# Patient Record
Sex: FEMALE | Race: WHITE | Employment: FULL TIME | URBAN - METROPOLITAN AREA
[De-identification: names, ages, dates, MRNs, and addresses within clinical notes are randomized per-mention and may not be internally consistent; named-entity substitution may affect disease eponyms.]

---

## 2017-04-05 PROBLEM — O36.5990 SMALL FOR DATES AFFECTING MANAGEMENT OF MOTHER: Status: ACTIVE | Noted: 2017-04-05

## 2017-04-05 PROBLEM — O09.299 H/O POSTPARTUM HEMORRHAGE, CURRENTLY PREGNANT: Status: ACTIVE | Noted: 2017-04-05

## 2017-04-05 PROBLEM — O09.03 ENCOUNTER FOR SUPERVISION OF HIGH-RISK PREGNANCY WITH HISTORY OF INFERTILITY IN THIRD TRIMESTER: Status: ACTIVE | Noted: 2017-04-05

## 2017-04-05 PROBLEM — O36.5990 SMALL FOR DATES AFFECTING MANAGEMENT OF MOTHER: Status: RESOLVED | Noted: 2017-04-05 | Resolved: 2017-04-05

## 2017-04-05 PROBLEM — R82.71 GBS BACTERIURIA: Status: ACTIVE | Noted: 2017-04-05

## 2017-04-05 PROBLEM — O36.8390 VARIABLE FETAL HEART RATE DECELERATIONS, ANTEPARTUM: Status: ACTIVE | Noted: 2017-04-05

## 2017-04-05 PROBLEM — O09.03 ENCOUNTER FOR SUPERVISION OF HIGH-RISK PREGNANCY WITH HISTORY OF INFERTILITY IN THIRD TRIMESTER: Status: RESOLVED | Noted: 2017-04-05 | Resolved: 2017-04-05

## 2017-04-05 PROBLEM — E28.2 PCOS (POLYCYSTIC OVARIAN SYNDROME): Status: ACTIVE | Noted: 2017-04-05

## 2017-04-05 PROBLEM — O28.3 ABNORMAL FETAL ULTRASOUND: Status: ACTIVE | Noted: 2017-04-05

## 2017-04-05 PROBLEM — O26.899 RH NEGATIVE STATUS DURING PREGNANCY: Status: ACTIVE | Noted: 2017-04-05

## 2017-04-05 PROBLEM — Z67.91 RH NEGATIVE STATUS DURING PREGNANCY: Status: ACTIVE | Noted: 2017-04-05

## 2018-05-11 ENCOUNTER — HOSPITAL ENCOUNTER (OUTPATIENT)
Age: 28
Discharge: HOME | End: 2018-05-13
Payer: COMMERCIAL

## 2018-05-11 DIAGNOSIS — E28.2 PCOS (POLYCYSTIC OVARIAN SYNDROME): ICD-10-CM

## 2018-05-11 LAB
FOLLICLE STIMULATING HORMONE: 7.3 MIU/ML
GLUCOSE FASTING: 75 MG/DL (ref 74–109)
GONADOTROPIN, CHORIONIC (HCG) QUANT: <0.1 MIU/ML
LUTEINIZING HORMONE: 50.7 MIU/ML
PROLACTIN: 6.37 NG/ML
TSH SERPL DL<=0.05 MIU/L-ACNC: 1.67 UIU/ML (ref 0.27–4.2)

## 2018-05-11 PROCEDURE — 84702 CHORIONIC GONADOTROPIN TEST: CPT

## 2018-05-11 PROCEDURE — 84146 ASSAY OF PROLACTIN: CPT

## 2018-05-11 PROCEDURE — 84403 ASSAY OF TOTAL TESTOSTERONE: CPT

## 2018-05-11 PROCEDURE — 82627 DEHYDROEPIANDROSTERONE: CPT

## 2018-05-11 PROCEDURE — 84270 ASSAY OF SEX HORMONE GLOBUL: CPT

## 2018-05-11 PROCEDURE — 83001 ASSAY OF GONADOTROPIN (FSH): CPT

## 2018-05-11 PROCEDURE — 83002 ASSAY OF GONADOTROPIN (LH): CPT

## 2018-05-11 PROCEDURE — 84443 ASSAY THYROID STIM HORMONE: CPT

## 2018-05-11 PROCEDURE — 82947 ASSAY GLUCOSE BLOOD QUANT: CPT

## 2018-05-11 PROCEDURE — 83498 ASY HYDROXYPROGESTERONE 17-D: CPT

## 2018-05-11 PROCEDURE — 83525 ASSAY OF INSULIN: CPT

## 2018-05-13 LAB — DHEAS (DHEA SULFATE): 126 UG/DL (ref 65–380)

## 2018-05-14 LAB — INSULIN: 5 UIU/ML (ref 3–19)

## 2018-05-15 LAB
SEX HORMONE BINDING GLOBULIN: 68 NMOL/L (ref 30–135)
TESTOSTERONE FREE-NONMALE: 4.4 PG/ML (ref 0.8–7.4)
TESTOSTERONE TOTAL: 40 NG/DL (ref 20–70)

## 2018-05-16 LAB — 17-OH PROGESTERONE LCMS: 136 NG/DL

## 2018-07-11 ENCOUNTER — HOSPITAL ENCOUNTER (OUTPATIENT)
Age: 28
Discharge: HOME | End: 2018-07-13

## 2018-07-11 PROCEDURE — 88305 TISSUE EXAM BY PATHOLOGIST: CPT

## 2018-09-17 ENCOUNTER — HOSPITAL ENCOUNTER (OUTPATIENT)
Age: 28
Discharge: HOME | End: 2018-09-19
Payer: COMMERCIAL

## 2018-09-17 DIAGNOSIS — N91.5 OLIGOMENORRHEA, UNSPECIFIED TYPE: ICD-10-CM

## 2018-09-17 PROBLEM — O36.8390 VARIABLE FETAL HEART RATE DECELERATIONS, ANTEPARTUM: Status: RESOLVED | Noted: 2017-04-05 | Resolved: 2018-09-17

## 2018-09-17 LAB
BILIRUBIN URINE: NEGATIVE
BLOOD, URINE: NEGATIVE
CLARITY: CLEAR
COLOR: YELLOW
GLUCOSE URINE: NEGATIVE MG/DL
KETONES, URINE: NEGATIVE MG/DL
LEUKOCYTE ESTERASE, URINE: NEGATIVE
NITRITE, URINE: NEGATIVE
PH UA: 6 (ref 5–9)
PROTEIN UA: NEGATIVE MG/DL
SPECIFIC GRAVITY UA: >=1.03 (ref 1–1.03)
UROBILINOGEN, URINE: 0.2 E.U./DL

## 2018-09-17 PROCEDURE — 87088 URINE BACTERIA CULTURE: CPT

## 2018-09-17 PROCEDURE — 81003 URINALYSIS AUTO W/O SCOPE: CPT

## 2018-09-19 LAB — URINE CULTURE, ROUTINE: NORMAL

## 2018-10-29 ENCOUNTER — HOSPITAL ENCOUNTER (OUTPATIENT)
Age: 28
Discharge: HOME | End: 2018-10-31
Payer: COMMERCIAL

## 2018-10-29 PROCEDURE — 87591 N.GONORRHOEAE DNA AMP PROB: CPT

## 2018-10-29 PROCEDURE — 87491 CHLMYD TRACH DNA AMP PROBE: CPT

## 2018-10-29 PROCEDURE — 88175 CYTOPATH C/V AUTO FLUID REDO: CPT

## 2018-11-01 LAB
CHLAMYDIA TRACHOMATIS AMPLIFIED DET: NORMAL
N GONORRHOEAE AMPLIFIED DET: NORMAL

## 2018-11-26 ENCOUNTER — HOSPITAL ENCOUNTER (OUTPATIENT)
Age: 28
Discharge: HOME | End: 2018-11-28
Payer: COMMERCIAL

## 2018-11-26 DIAGNOSIS — O09.92 HIGH-RISK PREGNANCY IN SECOND TRIMESTER: ICD-10-CM

## 2018-11-26 LAB
HBA1C MFR BLD: 5.1 % (ref 4–5.6)
HCT VFR BLD CALC: 41.3 % (ref 34–48)
HEMOGLOBIN: 13.3 G/DL (ref 11.5–15.5)
MCH RBC QN AUTO: 30.1 PG (ref 26–35)
MCHC RBC AUTO-ENTMCNC: 32.2 % (ref 32–34.5)
MCV RBC AUTO: 93.4 FL (ref 80–99.9)
PDW BLD-RTO: 12.6 FL (ref 11.5–15)
PLATELET # BLD: 214 E9/L (ref 130–450)
PMV BLD AUTO: 10.6 FL (ref 7–12)
RBC # BLD: 4.42 E12/L (ref 3.5–5.5)
TSH SERPL DL<=0.05 MIU/L-ACNC: 1.25 UIU/ML (ref 0.27–4.2)
WBC # BLD: 9.1 E9/L (ref 4.5–11.5)

## 2018-11-26 PROCEDURE — 86787 VARICELLA-ZOSTER ANTIBODY: CPT

## 2018-11-26 PROCEDURE — 86762 RUBELLA ANTIBODY: CPT

## 2018-11-26 PROCEDURE — 85027 COMPLETE CBC AUTOMATED: CPT

## 2018-11-26 PROCEDURE — 86900 BLOOD TYPING SEROLOGIC ABO: CPT

## 2018-11-26 PROCEDURE — 86901 BLOOD TYPING SEROLOGIC RH(D): CPT

## 2018-11-26 PROCEDURE — 86850 RBC ANTIBODY SCREEN: CPT

## 2018-11-26 PROCEDURE — 84443 ASSAY THYROID STIM HORMONE: CPT

## 2018-11-26 PROCEDURE — 87340 HEPATITIS B SURFACE AG IA: CPT

## 2018-11-26 PROCEDURE — 86803 HEPATITIS C AB TEST: CPT

## 2018-11-26 PROCEDURE — 86592 SYPHILIS TEST NON-TREP QUAL: CPT

## 2018-11-26 PROCEDURE — 86703 HIV-1/HIV-2 1 RESULT ANTBDY: CPT

## 2018-11-26 PROCEDURE — 83036 HEMOGLOBIN GLYCOSYLATED A1C: CPT

## 2018-11-27 LAB
ABO/RH: NORMAL
ANTIBODY SCREEN: NORMAL
HEPATITIS B SURFACE ANTIGEN INTERPRETATION: NORMAL
HEPATITIS C ANTIBODY INTERPRETATION: NORMAL
HIV-1 AND HIV-2 ANTIBODIES: NORMAL
RPR: NORMAL
RUBELLA ANTIBODY IGG: NORMAL
VARICELLA-ZOSTER VIRUS AB, IGG: NORMAL

## 2019-01-01 ENCOUNTER — HOSPITAL ENCOUNTER (EMERGENCY)
Age: 29
Discharge: HOME | End: 2019-01-01
Payer: COMMERCIAL

## 2019-01-01 ENCOUNTER — APPOINTMENT (OUTPATIENT)
Dept: GENERAL RADIOLOGY | Age: 29
End: 2019-01-01
Payer: COMMERCIAL

## 2019-01-01 VITALS
SYSTOLIC BLOOD PRESSURE: 112 MMHG | HEART RATE: 86 BPM | WEIGHT: 151 LBS | HEIGHT: 63 IN | DIASTOLIC BLOOD PRESSURE: 73 MMHG | BODY MASS INDEX: 26.75 KG/M2 | TEMPERATURE: 98 F | RESPIRATION RATE: 12 BRPM | OXYGEN SATURATION: 100 %

## 2019-01-01 DIAGNOSIS — S92.424A CLOSED NONDISPLACED FRACTURE OF DISTAL PHALANX OF RIGHT GREAT TOE, INITIAL ENCOUNTER: Primary | ICD-10-CM

## 2019-01-01 PROCEDURE — 6370000000 HC RX 637 (ALT 250 FOR IP): Performed by: PHYSICIAN ASSISTANT

## 2019-01-01 PROCEDURE — 73660 X-RAY EXAM OF TOE(S): CPT

## 2019-01-01 PROCEDURE — 99283 EMERGENCY DEPT VISIT LOW MDM: CPT

## 2019-01-01 RX ORDER — ACETAMINOPHEN 500 MG
1000 TABLET ORAL ONCE
Status: COMPLETED | OUTPATIENT
Start: 2019-01-01 | End: 2019-01-01

## 2019-01-01 RX ADMIN — ACETAMINOPHEN 1000 MG: 500 TABLET ORAL at 18:26

## 2019-01-01 ASSESSMENT — PAIN SCALES - GENERAL
PAINLEVEL_OUTOF10: 4
PAINLEVEL_OUTOF10: 5

## 2019-01-01 ASSESSMENT — PAIN DESCRIPTION - FREQUENCY: FREQUENCY: CONTINUOUS

## 2019-01-01 ASSESSMENT — PAIN DESCRIPTION - PAIN TYPE: TYPE: ACUTE PAIN

## 2019-01-01 ASSESSMENT — PAIN DESCRIPTION - PROGRESSION: CLINICAL_PROGRESSION: GRADUALLY WORSENING

## 2019-01-01 ASSESSMENT — PAIN DESCRIPTION - ORIENTATION: ORIENTATION: RIGHT

## 2019-01-01 ASSESSMENT — PAIN DESCRIPTION - LOCATION: LOCATION: TOE (COMMENT WHICH ONE)

## 2019-01-01 ASSESSMENT — PAIN DESCRIPTION - ONSET: ONSET: SUDDEN

## 2019-01-01 ASSESSMENT — PAIN DESCRIPTION - DESCRIPTORS: DESCRIPTORS: THROBBING

## 2019-02-15 ENCOUNTER — HOSPITAL ENCOUNTER (OUTPATIENT)
Age: 29
Discharge: HOME | End: 2019-02-17
Payer: COMMERCIAL

## 2019-02-15 DIAGNOSIS — Z34.93 PRENATAL CARE IN THIRD TRIMESTER: ICD-10-CM

## 2019-02-15 LAB
GLUCOSE TOLERANCE TEST 1 HOUR: 135 MG/DL
GLUCOSE TOLERANCE TEST 2 HOUR: 74 MG/DL
GLUCOSE TOLERANCE TEST FASTING: 73 MG/DL
HCT VFR BLD CALC: 37 % (ref 34–48)
HEMOGLOBIN: 11.7 G/DL (ref 11.5–15.5)
MCH RBC QN AUTO: 31.2 PG (ref 26–35)
MCHC RBC AUTO-ENTMCNC: 31.6 % (ref 32–34.5)
MCV RBC AUTO: 98.7 FL (ref 80–99.9)
PDW BLD-RTO: 13.2 FL (ref 11.5–15)
PLATELET # BLD: 177 E9/L (ref 130–450)
PMV BLD AUTO: 9.7 FL (ref 7–12)
RBC # BLD: 3.75 E12/L (ref 3.5–5.5)
WBC # BLD: 9.8 E9/L (ref 4.5–11.5)

## 2019-02-15 PROCEDURE — 86900 BLOOD TYPING SEROLOGIC ABO: CPT

## 2019-02-15 PROCEDURE — 86850 RBC ANTIBODY SCREEN: CPT

## 2019-02-15 PROCEDURE — 85027 COMPLETE CBC AUTOMATED: CPT

## 2019-02-15 PROCEDURE — 86901 BLOOD TYPING SEROLOGIC RH(D): CPT

## 2019-02-15 PROCEDURE — 82951 GLUCOSE TOLERANCE TEST (GTT): CPT

## 2019-02-16 LAB
ABO/RH: NORMAL
ANTIBODY SCREEN: NORMAL

## 2019-02-19 ENCOUNTER — HOSPITAL ENCOUNTER (OUTPATIENT)
Age: 29
Discharge: HOME | End: 2019-02-19
Attending: OBSTETRICS & GYNECOLOGY | Admitting: OBSTETRICS & GYNECOLOGY
Payer: COMMERCIAL

## 2019-02-19 PROBLEM — Z29.13 NEED FOR RHOGAM DUE TO RH NEGATIVE MOTHER: Status: ACTIVE | Noted: 2019-02-19

## 2019-02-19 LAB — RHIG LOT NUMBER: NORMAL

## 2019-02-19 PROCEDURE — 96372 THER/PROPH/DIAG INJ SC/IM: CPT

## 2019-02-19 PROCEDURE — 6360000002 HC RX W HCPCS: Performed by: OBSTETRICS & GYNECOLOGY

## 2019-02-19 RX ADMIN — HUMAN RHO(D) IMMUNE GLOBULIN 300 MCG: 300 INJECTION, SOLUTION INTRAMUSCULAR at 17:01

## 2019-03-13 ENCOUNTER — HOSPITAL ENCOUNTER (OUTPATIENT)
Age: 29
Discharge: HOME | End: 2019-03-13
Attending: EMERGENCY MEDICINE | Admitting: OBSTETRICS & GYNECOLOGY
Payer: COMMERCIAL

## 2019-03-13 VITALS
WEIGHT: 171 LBS | OXYGEN SATURATION: 98 % | HEIGHT: 63 IN | DIASTOLIC BLOOD PRESSURE: 62 MMHG | TEMPERATURE: 98.4 F | BODY MASS INDEX: 30.3 KG/M2 | HEART RATE: 92 BPM | SYSTOLIC BLOOD PRESSURE: 104 MMHG | RESPIRATION RATE: 16 BRPM

## 2019-03-13 DIAGNOSIS — B34.9 VIRAL ILLNESS: Primary | ICD-10-CM

## 2019-03-13 DIAGNOSIS — R05.9 COUGH: ICD-10-CM

## 2019-03-13 DIAGNOSIS — J02.9 VIRAL PHARYNGITIS: ICD-10-CM

## 2019-03-13 PROBLEM — O36.8190 DECREASED FETAL MOVEMENT AFFECTING MANAGEMENT OF MOTHER, ANTEPARTUM: Status: ACTIVE | Noted: 2019-03-13

## 2019-03-13 LAB
ALBUMIN SERPL-MCNC: 3.6 G/DL (ref 3.5–5.2)
ALP BLD-CCNC: 84 U/L (ref 35–104)
ALT SERPL-CCNC: 8 U/L (ref 0–32)
ANION GAP SERPL CALCULATED.3IONS-SCNC: 10 MMOL/L (ref 7–16)
AST SERPL-CCNC: 15 U/L (ref 0–31)
BACTERIA: NORMAL /HPF
BASOPHILS ABSOLUTE: 0.03 E9/L (ref 0–0.2)
BASOPHILS RELATIVE PERCENT: 0.3 % (ref 0–2)
BILIRUB SERPL-MCNC: 0.2 MG/DL (ref 0–1.2)
BILIRUBIN URINE: NEGATIVE
BLOOD, URINE: ABNORMAL
BUN BLDV-MCNC: 6 MG/DL (ref 6–20)
CALCIUM SERPL-MCNC: 8.7 MG/DL (ref 8.6–10.2)
CHLORIDE BLD-SCNC: 103 MMOL/L (ref 98–107)
CLARITY: CLEAR
CO2: 24 MMOL/L (ref 22–29)
COLOR: YELLOW
CREAT SERPL-MCNC: 0.6 MG/DL (ref 0.5–1)
EOSINOPHILS ABSOLUTE: 0.12 E9/L (ref 0.05–0.5)
EOSINOPHILS RELATIVE PERCENT: 1 % (ref 0–6)
EPITHELIAL CELLS, UA: NORMAL /HPF
GFR AFRICAN AMERICAN: >60
GFR NON-AFRICAN AMERICAN: >60 ML/MIN/1.73
GLUCOSE BLD-MCNC: 81 MG/DL (ref 74–99)
GLUCOSE URINE: NEGATIVE MG/DL
HCT VFR BLD CALC: 40.3 % (ref 34–48)
HEMOGLOBIN: 13.2 G/DL (ref 11.5–15.5)
IMMATURE GRANULOCYTES #: 0.08 E9/L
IMMATURE GRANULOCYTES %: 0.7 % (ref 0–5)
INFLUENZA A BY PCR: NOT DETECTED
INFLUENZA B BY PCR: NOT DETECTED
KETONES, URINE: NEGATIVE MG/DL
LEUKOCYTE ESTERASE, URINE: ABNORMAL
LYMPHOCYTES ABSOLUTE: 2.01 E9/L (ref 1.5–4)
LYMPHOCYTES RELATIVE PERCENT: 17.2 % (ref 20–42)
MAGNESIUM: 1.9 MG/DL (ref 1.6–2.6)
MCH RBC QN AUTO: 31.7 PG (ref 26–35)
MCHC RBC AUTO-ENTMCNC: 32.8 % (ref 32–34.5)
MCV RBC AUTO: 96.6 FL (ref 80–99.9)
MONOCYTES ABSOLUTE: 0.74 E9/L (ref 0.1–0.95)
MONOCYTES RELATIVE PERCENT: 6.3 % (ref 2–12)
NEUTROPHILS ABSOLUTE: 8.69 E9/L (ref 1.8–7.3)
NEUTROPHILS RELATIVE PERCENT: 74.5 % (ref 43–80)
NITRITE, URINE: NEGATIVE
PDW BLD-RTO: 13.1 FL (ref 11.5–15)
PH UA: 7 (ref 5–9)
PLATELET # BLD: 152 E9/L (ref 130–450)
PMV BLD AUTO: 9.3 FL (ref 7–12)
POTASSIUM SERPL-SCNC: 4.6 MMOL/L (ref 3.5–5)
PROTEIN UA: NEGATIVE MG/DL
RBC # BLD: 4.17 E12/L (ref 3.5–5.5)
RBC UA: NORMAL /HPF (ref 0–2)
SODIUM BLD-SCNC: 137 MMOL/L (ref 132–146)
SPECIFIC GRAVITY UA: 1.01 (ref 1–1.03)
STREP GRP A PCR: NEGATIVE
TOTAL PROTEIN: 6.7 G/DL (ref 6.4–8.3)
UROBILINOGEN, URINE: 0.2 E.U./DL
WBC # BLD: 11.7 E9/L (ref 4.5–11.5)
WBC UA: NORMAL /HPF (ref 0–5)

## 2019-03-13 PROCEDURE — 87502 INFLUENZA DNA AMP PROBE: CPT

## 2019-03-13 PROCEDURE — 85025 COMPLETE CBC W/AUTO DIFF WBC: CPT

## 2019-03-13 PROCEDURE — 83735 ASSAY OF MAGNESIUM: CPT

## 2019-03-13 PROCEDURE — 99201 HC NEW PT, OUTPT VISIT LEVEL 1: CPT

## 2019-03-13 PROCEDURE — 2580000003 HC RX 258: Performed by: PHYSICIAN ASSISTANT

## 2019-03-13 PROCEDURE — 36415 COLL VENOUS BLD VENIPUNCTURE: CPT

## 2019-03-13 PROCEDURE — 59025 FETAL NON-STRESS TEST: CPT | Performed by: NURSE PRACTITIONER

## 2019-03-13 PROCEDURE — 87880 STREP A ASSAY W/OPTIC: CPT

## 2019-03-13 PROCEDURE — 81001 URINALYSIS AUTO W/SCOPE: CPT

## 2019-03-13 PROCEDURE — 80053 COMPREHEN METABOLIC PANEL: CPT

## 2019-03-13 PROCEDURE — 6370000000 HC RX 637 (ALT 250 FOR IP): Performed by: NURSE PRACTITIONER

## 2019-03-13 PROCEDURE — 59025 FETAL NON-STRESS TEST: CPT

## 2019-03-13 PROCEDURE — 99284 EMERGENCY DEPT VISIT MOD MDM: CPT

## 2019-03-13 RX ORDER — DEXTROMETHORPHAN POLISTIREX 30 MG/5ML
30 SUSPENSION ORAL EVERY 12 HOURS SCHEDULED
Status: DISCONTINUED | OUTPATIENT
Start: 2019-03-13 | End: 2019-03-13 | Stop reason: HOSPADM

## 2019-03-13 RX ORDER — SODIUM CHLORIDE 9 MG/ML
1000 INJECTION, SOLUTION INTRAVENOUS ONCE
Status: COMPLETED | OUTPATIENT
Start: 2019-03-13 | End: 2019-03-13

## 2019-03-13 RX ADMIN — SODIUM CHLORIDE 1000 ML: 9 INJECTION, SOLUTION INTRAVENOUS at 09:57

## 2019-03-13 RX ADMIN — BENZOCAINE AND MENTHOL 1 LOZENGE: 15; 3.6 LOZENGE ORAL at 15:04

## 2019-03-13 RX ADMIN — DEXTROMETHORPHAN 30 MG: 30 SUSPENSION, EXTENDED RELEASE ORAL at 15:08

## 2019-04-05 ENCOUNTER — HOSPITAL ENCOUNTER (OUTPATIENT)
Age: 29
Discharge: HOME | End: 2019-04-07
Payer: COMMERCIAL

## 2019-04-05 DIAGNOSIS — O09.92 HIGH-RISK PREGNANCY IN SECOND TRIMESTER: ICD-10-CM

## 2019-04-05 LAB
HCT VFR BLD CALC: 39.9 % (ref 34–48)
HEMOGLOBIN: 13 G/DL (ref 11.5–15.5)
MCH RBC QN AUTO: 31.2 PG (ref 26–35)
MCHC RBC AUTO-ENTMCNC: 32.6 % (ref 32–34.5)
MCV RBC AUTO: 95.7 FL (ref 80–99.9)
PDW BLD-RTO: 12.8 FL (ref 11.5–15)
PLATELET # BLD: 168 E9/L (ref 130–450)
PMV BLD AUTO: 9.8 FL (ref 7–12)
RBC # BLD: 4.17 E12/L (ref 3.5–5.5)
WBC # BLD: 10.6 E9/L (ref 4.5–11.5)

## 2019-04-05 PROCEDURE — 87491 CHLMYD TRACH DNA AMP PROBE: CPT

## 2019-04-05 PROCEDURE — 87591 N.GONORRHOEAE DNA AMP PROB: CPT

## 2019-04-05 PROCEDURE — 87081 CULTURE SCREEN ONLY: CPT

## 2019-04-05 PROCEDURE — 87147 CULTURE TYPE IMMUNOLOGIC: CPT

## 2019-04-05 PROCEDURE — 85027 COMPLETE CBC AUTOMATED: CPT

## 2019-04-07 LAB
GROUP B STREP CULTURE: ABNORMAL
GROUP B STREP CULTURE: ABNORMAL
ORGANISM: ABNORMAL

## 2019-04-09 LAB
CHLAMYDIA TRACHOMATIS AMPLIFIED DET: NORMAL
N GONORRHOEAE AMPLIFIED DET: NORMAL

## 2019-04-21 ENCOUNTER — HOSPITAL ENCOUNTER (OUTPATIENT)
Age: 29
Discharge: HOME | End: 2019-04-21
Attending: OBSTETRICS & GYNECOLOGY | Admitting: OBSTETRICS & GYNECOLOGY
Payer: COMMERCIAL

## 2019-04-21 VITALS — SYSTOLIC BLOOD PRESSURE: 111 MMHG | HEART RATE: 88 BPM | DIASTOLIC BLOOD PRESSURE: 73 MMHG

## 2019-04-21 PROBLEM — Z3A.36 PREGNANCY WITH 36 COMPLETED WEEKS GESTATION: Status: ACTIVE | Noted: 2019-04-21

## 2019-04-21 PROCEDURE — 99201 HC NEW PT, OUTPT VISIT LEVEL 1: CPT

## 2019-04-22 NOTE — PROGRESS NOTES
-prev vag/term, 36w3d, tre . Pt presents to labor and deliver for onset of swelling that started this morning and has gotten worse throughout the day. She states her hands and feet feel tight and are itchy. She denies headache and visual change. She states she has been having ctx all day that have been irregular in frequency and intensity. Sometimes 1 an hour, sometimes 3 an hour. Sometimes 3/10, other times 7/10. States she experiences chest tightness/pain and back pain when ctx are happening. Denies vb, lof, and decreased fm. States she hemorrhaged after both previous deliveries and needed an IM shot but no transfusions. States she sees high risk for placenta previa(resolved), abnormalities with baby(resolved), \"borderline polydramnios\", and LGA.

## 2019-05-01 ENCOUNTER — HOSPITAL ENCOUNTER (OUTPATIENT)
Age: 29
Discharge: HOME | End: 2019-05-01
Attending: OBSTETRICS & GYNECOLOGY | Admitting: OBSTETRICS & GYNECOLOGY
Payer: COMMERCIAL

## 2019-05-01 PROBLEM — O40.9XX0 POLYHYDRAMNIOS: Status: ACTIVE | Noted: 2019-05-01

## 2019-05-01 PROCEDURE — 99211 OFF/OP EST MAY X REQ PHY/QHP: CPT

## 2019-05-01 PROCEDURE — 6360000002 HC RX W HCPCS: Performed by: NURSE PRACTITIONER

## 2019-05-01 RX ORDER — BETAMETHASONE SODIUM PHOSPHATE AND BETAMETHASONE ACETATE 3; 3 MG/ML; MG/ML
12 INJECTION, SUSPENSION INTRA-ARTICULAR; INTRALESIONAL; INTRAMUSCULAR; SOFT TISSUE EVERY 24 HOURS
Status: DISCONTINUED | OUTPATIENT
Start: 2019-05-01 | End: 2019-05-01 | Stop reason: HOSPADM

## 2019-05-01 RX ADMIN — BETAMETHASONE SODIUM PHOSPHATE AND BETAMETHASONE ACETATE 12 MG: 3; 3 INJECTION, SUSPENSION INTRA-ARTICULAR; INTRALESIONAL; INTRAMUSCULAR at 14:52

## 2019-05-01 NOTE — PROGRESS NOTES
Celestone injection given in left gluteal, pt instructed to return tomorrow around 1500 for 2nd dose.  Verbalized understanding

## 2019-05-02 ENCOUNTER — ANESTHESIA EVENT (OUTPATIENT)
Dept: LABOR AND DELIVERY | Age: 29
End: 2019-05-02
Payer: COMMERCIAL

## 2019-05-02 ENCOUNTER — HOSPITAL ENCOUNTER (OUTPATIENT)
Age: 29
Discharge: HOME | End: 2019-05-02
Attending: OBSTETRICS & GYNECOLOGY | Admitting: OBSTETRICS & GYNECOLOGY
Payer: COMMERCIAL

## 2019-05-02 PROBLEM — Z3A.38 38 WEEKS GESTATION OF PREGNANCY: Status: ACTIVE | Noted: 2019-05-02

## 2019-05-02 PROCEDURE — 96372 THER/PROPH/DIAG INJ SC/IM: CPT

## 2019-05-02 PROCEDURE — 6360000002 HC RX W HCPCS: Performed by: OBSTETRICS & GYNECOLOGY

## 2019-05-02 RX ORDER — BETAMETHASONE SODIUM PHOSPHATE AND BETAMETHASONE ACETATE 3; 3 MG/ML; MG/ML
12 INJECTION, SUSPENSION INTRA-ARTICULAR; INTRALESIONAL; INTRAMUSCULAR; SOFT TISSUE ONCE
Status: COMPLETED | OUTPATIENT
Start: 2019-05-02 | End: 2019-05-02

## 2019-05-02 RX ADMIN — BETAMETHASONE SODIUM PHOSPHATE AND BETAMETHASONE ACETATE 12 MG: 3; 3 INJECTION, SUSPENSION INTRA-ARTICULAR; INTRALESIONAL; INTRAMUSCULAR at 15:13

## 2019-05-03 ENCOUNTER — ANESTHESIA EVENT (OUTPATIENT)
Dept: LABOR AND DELIVERY | Age: 29
End: 2019-05-03
Payer: COMMERCIAL

## 2019-05-03 ENCOUNTER — ANESTHESIA (OUTPATIENT)
Dept: LABOR AND DELIVERY | Age: 29
End: 2019-05-03
Payer: COMMERCIAL

## 2019-05-03 ENCOUNTER — HOSPITAL ENCOUNTER (INPATIENT)
Age: 29
LOS: 2 days | Discharge: HOME | End: 2019-05-05
Attending: OBSTETRICS & GYNECOLOGY | Admitting: OBSTETRICS & GYNECOLOGY
Payer: COMMERCIAL

## 2019-05-03 VITALS — DIASTOLIC BLOOD PRESSURE: 55 MMHG | OXYGEN SATURATION: 99 % | SYSTOLIC BLOOD PRESSURE: 103 MMHG

## 2019-05-03 DIAGNOSIS — G89.18 POSTOPERATIVE PAIN: ICD-10-CM

## 2019-05-03 PROBLEM — O28.3 ABNORMAL FETAL ULTRASOUND: Status: ACTIVE | Noted: 2019-05-03

## 2019-05-03 PROBLEM — O09.00 PREGNANCY WITH HISTORY OF INFERTILITY: Status: ACTIVE | Noted: 2019-05-03

## 2019-05-03 PROBLEM — Z3A.36 PREGNANCY WITH 36 COMPLETED WEEKS GESTATION: Status: RESOLVED | Noted: 2019-04-21 | Resolved: 2019-05-03

## 2019-05-03 PROBLEM — O40.9XX0 POLYHYDRAMNIOS: Status: RESOLVED | Noted: 2019-05-01 | Resolved: 2019-05-03

## 2019-05-03 PROBLEM — O35.EXX0 PYELECTASIS OF FETUS ON PRENATAL ULTRASOUND: Status: ACTIVE | Noted: 2019-05-03

## 2019-05-03 PROBLEM — O28.3 INCREASED NUCHAL TRANSLUCENCY SPACE ON FETAL ULTRASOUND: Status: ACTIVE | Noted: 2019-05-03

## 2019-05-03 PROBLEM — Z29.13 NEED FOR RHOGAM DUE TO RH NEGATIVE MOTHER: Status: RESOLVED | Noted: 2019-02-19 | Resolved: 2019-05-03

## 2019-05-03 PROBLEM — O35.BXX0 ECHOGENIC FOCUS OF HEART OF FETUS AFFECTING ANTEPARTUM CARE OF MOTHER: Status: ACTIVE | Noted: 2019-05-03

## 2019-05-03 PROBLEM — O28.3 ABNORMAL FETAL ULTRASOUND: Status: RESOLVED | Noted: 2017-04-05 | Resolved: 2019-05-03

## 2019-05-03 PROBLEM — R82.71 GBS BACTERIURIA: Status: RESOLVED | Noted: 2017-04-05 | Resolved: 2019-05-03

## 2019-05-03 PROBLEM — O09.92 HIGH-RISK PREGNANCY IN SECOND TRIMESTER: Status: ACTIVE | Noted: 2019-05-03

## 2019-05-03 PROBLEM — O36.8190 DECREASED FETAL MOVEMENT AFFECTING MANAGEMENT OF MOTHER, ANTEPARTUM: Status: RESOLVED | Noted: 2019-03-13 | Resolved: 2019-05-03

## 2019-05-03 PROBLEM — O40.9XX0 POLYHYDRAMNIOS: Status: ACTIVE | Noted: 2019-05-03

## 2019-05-03 PROBLEM — Z34.83 MULTIGRAVIDA IN THIRD TRIMESTER: Status: ACTIVE | Noted: 2019-05-03

## 2019-05-03 LAB
ABO/RH: NORMAL
AMPHETAMINE SCREEN, URINE: NOT DETECTED
ANTIBODY IDENTIFICATION: NORMAL
ANTIBODY SCREEN: NORMAL
BARBITURATE SCREEN URINE: NOT DETECTED
BENZODIAZEPINE SCREEN, URINE: NOT DETECTED
CANNABINOID SCREEN URINE: NOT DETECTED
COCAINE METABOLITE SCREEN URINE: NOT DETECTED
DAT POLYSPECIFIC: NORMAL
HCT VFR BLD CALC: 38.4 % (ref 34–48)
HEMOGLOBIN: 12.7 G/DL (ref 11.5–15.5)
MCH RBC QN AUTO: 31.6 PG (ref 26–35)
MCHC RBC AUTO-ENTMCNC: 33.1 % (ref 32–34.5)
MCV RBC AUTO: 95.5 FL (ref 80–99.9)
METHADONE SCREEN, URINE: NOT DETECTED
OPIATE SCREEN URINE: NOT DETECTED
PDW BLD-RTO: 12.9 FL (ref 11.5–15)
PHENCYCLIDINE SCREEN URINE: NOT DETECTED
PLATELET # BLD: 174 E9/L (ref 130–450)
PMV BLD AUTO: 10.3 FL (ref 7–12)
PROPOXYPHENE SCREEN: NOT DETECTED
RBC # BLD: 4.02 E12/L (ref 3.5–5.5)
WBC # BLD: 13.8 E9/L (ref 4.5–11.5)

## 2019-05-03 PROCEDURE — 6370000000 HC RX 637 (ALT 250 FOR IP): Performed by: OBSTETRICS & GYNECOLOGY

## 2019-05-03 PROCEDURE — 2500000003 HC RX 250 WO HCPCS

## 2019-05-03 PROCEDURE — 85027 COMPLETE CBC AUTOMATED: CPT

## 2019-05-03 PROCEDURE — 80307 DRUG TEST PRSMV CHEM ANLYZR: CPT

## 2019-05-03 PROCEDURE — 2580000003 HC RX 258: Performed by: OBSTETRICS & GYNECOLOGY

## 2019-05-03 PROCEDURE — 3700000001 HC ADD 15 MINUTES (ANESTHESIA): Performed by: OBSTETRICS & GYNECOLOGY

## 2019-05-03 PROCEDURE — 86900 BLOOD TYPING SEROLOGIC ABO: CPT

## 2019-05-03 PROCEDURE — 7100000000 HC PACU RECOVERY - FIRST 15 MIN: Performed by: OBSTETRICS & GYNECOLOGY

## 2019-05-03 PROCEDURE — 86880 COOMBS TEST DIRECT: CPT

## 2019-05-03 PROCEDURE — 6360000002 HC RX W HCPCS: Performed by: OBSTETRICS & GYNECOLOGY

## 2019-05-03 PROCEDURE — 86850 RBC ANTIBODY SCREEN: CPT

## 2019-05-03 PROCEDURE — 6360000002 HC RX W HCPCS

## 2019-05-03 PROCEDURE — 3700000000 HC ANESTHESIA ATTENDED CARE: Performed by: OBSTETRICS & GYNECOLOGY

## 2019-05-03 PROCEDURE — 86901 BLOOD TYPING SEROLOGIC RH(D): CPT

## 2019-05-03 PROCEDURE — 7100000001 HC PACU RECOVERY - ADDTL 15 MIN: Performed by: OBSTETRICS & GYNECOLOGY

## 2019-05-03 PROCEDURE — 36415 COLL VENOUS BLD VENIPUNCTURE: CPT

## 2019-05-03 PROCEDURE — 59514 CESAREAN DELIVERY ONLY: CPT | Performed by: OBSTETRICS & GYNECOLOGY

## 2019-05-03 PROCEDURE — 6370000000 HC RX 637 (ALT 250 FOR IP): Performed by: ANESTHESIOLOGY

## 2019-05-03 PROCEDURE — 2709999900 HC NON-CHARGEABLE SUPPLY: Performed by: OBSTETRICS & GYNECOLOGY

## 2019-05-03 PROCEDURE — 2500000003 HC RX 250 WO HCPCS: Performed by: OBSTETRICS & GYNECOLOGY

## 2019-05-03 PROCEDURE — 86870 RBC ANTIBODY IDENTIFICATION: CPT

## 2019-05-03 PROCEDURE — 1220000000 HC SEMI PRIVATE OB R&B

## 2019-05-03 PROCEDURE — 3609079900 HC CESAREAN SECTION: Performed by: OBSTETRICS & GYNECOLOGY

## 2019-05-03 RX ORDER — BISACODYL 10 MG
10 SUPPOSITORY, RECTAL RECTAL DAILY PRN
Status: DISCONTINUED | OUTPATIENT
Start: 2019-05-05 | End: 2019-05-05 | Stop reason: HOSPADM

## 2019-05-03 RX ORDER — PRENATAL WITH FERROUS FUM AND FOLIC ACID 3080; 920; 120; 400; 22; 1.84; 3; 20; 10; 1; 12; 200; 27; 25; 2 [IU]/1; [IU]/1; MG/1; [IU]/1; MG/1; MG/1; MG/1; MG/1; MG/1; MG/1; UG/1; MG/1; MG/1; MG/1; MG/1
1 TABLET ORAL
Status: DISCONTINUED | OUTPATIENT
Start: 2019-05-04 | End: 2019-05-05 | Stop reason: HOSPADM

## 2019-05-03 RX ORDER — ONDANSETRON 2 MG/ML
INJECTION INTRAMUSCULAR; INTRAVENOUS PRN
Status: DISCONTINUED | OUTPATIENT
Start: 2019-05-03 | End: 2019-05-03 | Stop reason: SDUPTHER

## 2019-05-03 RX ORDER — PHENYLEPHRINE HYDROCHLORIDE 10 MG/ML
INJECTION INTRAVENOUS PRN
Status: DISCONTINUED | OUTPATIENT
Start: 2019-05-03 | End: 2019-05-03 | Stop reason: SDUPTHER

## 2019-05-03 RX ORDER — ONDANSETRON 2 MG/ML
4 INJECTION INTRAMUSCULAR; INTRAVENOUS EVERY 6 HOURS PRN
Status: DISCONTINUED | OUTPATIENT
Start: 2019-05-03 | End: 2019-05-05 | Stop reason: HOSPADM

## 2019-05-03 RX ORDER — METHYLERGONOVINE MALEATE 0.2 MG/ML
200 INJECTION INTRAVENOUS PRN
Status: DISCONTINUED | OUTPATIENT
Start: 2019-05-03 | End: 2019-05-05 | Stop reason: HOSPADM

## 2019-05-03 RX ORDER — METFORMIN HYDROCHLORIDE 500 MG/1
500 TABLET, EXTENDED RELEASE ORAL
Status: DISCONTINUED | OUTPATIENT
Start: 2019-05-04 | End: 2019-05-05 | Stop reason: HOSPADM

## 2019-05-03 RX ORDER — LIDOCAINE HYDROCHLORIDE 10 MG/ML
INJECTION, SOLUTION INFILTRATION; PERINEURAL PRN
Status: DISCONTINUED | OUTPATIENT
Start: 2019-05-03 | End: 2019-05-03 | Stop reason: SDUPTHER

## 2019-05-03 RX ORDER — DOCUSATE SODIUM 100 MG/1
100 CAPSULE, LIQUID FILLED ORAL 2 TIMES DAILY
Status: DISCONTINUED | OUTPATIENT
Start: 2019-05-03 | End: 2019-05-05 | Stop reason: HOSPADM

## 2019-05-03 RX ORDER — CARBOPROST TROMETHAMINE 250 UG/ML
INJECTION, SOLUTION INTRAMUSCULAR
Status: DISCONTINUED
Start: 2019-05-03 | End: 2019-05-03 | Stop reason: WASHOUT

## 2019-05-03 RX ORDER — ACETAMINOPHEN 325 MG/1
325 TABLET ORAL EVERY 4 HOURS PRN
Status: DISCONTINUED | OUTPATIENT
Start: 2019-05-03 | End: 2019-05-05 | Stop reason: HOSPADM

## 2019-05-03 RX ORDER — PRENATAL 71/IRON/FOLIC AC/DHA 30-1.4-2
1 CAPSULE,IMMEDIATE, DELAY RELEASE,BIPHASE ORAL
Status: DISCONTINUED | OUTPATIENT
Start: 2019-05-04 | End: 2019-05-03 | Stop reason: CLARIF

## 2019-05-03 RX ORDER — SODIUM CHLORIDE, SODIUM LACTATE, POTASSIUM CHLORIDE, CALCIUM CHLORIDE 600; 310; 30; 20 MG/100ML; MG/100ML; MG/100ML; MG/100ML
INJECTION, SOLUTION INTRAVENOUS CONTINUOUS
Status: DISCONTINUED | OUTPATIENT
Start: 2019-05-03 | End: 2019-05-05 | Stop reason: HOSPADM

## 2019-05-03 RX ORDER — KETOROLAC TROMETHAMINE 30 MG/ML
30 INJECTION, SOLUTION INTRAMUSCULAR; INTRAVENOUS EVERY 6 HOURS
Status: COMPLETED | OUTPATIENT
Start: 2019-05-03 | End: 2019-05-04

## 2019-05-03 RX ORDER — SIMETHICONE 80 MG
80 TABLET,CHEWABLE ORAL EVERY 6 HOURS PRN
Status: DISCONTINUED | OUTPATIENT
Start: 2019-05-03 | End: 2019-05-05 | Stop reason: HOSPADM

## 2019-05-03 RX ORDER — OXYCODONE HYDROCHLORIDE AND ACETAMINOPHEN 5; 325 MG/1; MG/1
1 TABLET ORAL EVERY 4 HOURS PRN
Status: DISCONTINUED | OUTPATIENT
Start: 2019-05-04 | End: 2019-05-05 | Stop reason: HOSPADM

## 2019-05-03 RX ORDER — DIPHENHYDRAMINE HYDROCHLORIDE 50 MG/ML
25 INJECTION INTRAMUSCULAR; INTRAVENOUS EVERY 6 HOURS PRN
Status: ACTIVE | OUTPATIENT
Start: 2019-05-03 | End: 2019-05-04

## 2019-05-03 RX ORDER — DEXAMETHASONE SODIUM PHOSPHATE 4 MG/ML
INJECTION, SOLUTION INTRA-ARTICULAR; INTRALESIONAL; INTRAMUSCULAR; INTRAVENOUS; SOFT TISSUE PRN
Status: DISCONTINUED | OUTPATIENT
Start: 2019-05-03 | End: 2019-05-03 | Stop reason: SDUPTHER

## 2019-05-03 RX ORDER — HYDROCODONE BITARTRATE AND ACETAMINOPHEN 5; 325 MG/1; MG/1
1 TABLET ORAL EVERY 4 HOURS PRN
Status: DISPENSED | OUTPATIENT
Start: 2019-05-03 | End: 2019-05-04

## 2019-05-03 RX ORDER — IBUPROFEN 600 MG/1
600 TABLET ORAL EVERY 6 HOURS PRN
Status: DISCONTINUED | OUTPATIENT
Start: 2019-05-04 | End: 2019-05-05 | Stop reason: HOSPADM

## 2019-05-03 RX ORDER — LANOLIN 100 %
OINTMENT (GRAM) TOPICAL
Status: DISCONTINUED | OUTPATIENT
Start: 2019-05-03 | End: 2019-05-05 | Stop reason: HOSPADM

## 2019-05-03 RX ORDER — PROMETHAZINE HYDROCHLORIDE 25 MG/ML
INJECTION, SOLUTION INTRAMUSCULAR; INTRAVENOUS PRN
Status: DISCONTINUED | OUTPATIENT
Start: 2019-05-03 | End: 2019-05-03 | Stop reason: SDUPTHER

## 2019-05-03 RX ORDER — MORPHINE SULFATE 1 MG/ML
INJECTION, SOLUTION EPIDURAL; INTRATHECAL; INTRAVENOUS PRN
Status: DISCONTINUED | OUTPATIENT
Start: 2019-05-03 | End: 2019-05-03 | Stop reason: SDUPTHER

## 2019-05-03 RX ORDER — BUPIVACAINE HYDROCHLORIDE 7.5 MG/ML
INJECTION, SOLUTION INTRASPINAL PRN
Status: DISCONTINUED | OUTPATIENT
Start: 2019-05-03 | End: 2019-05-03 | Stop reason: SDUPTHER

## 2019-05-03 RX ORDER — METHYLERGONOVINE MALEATE 0.2 MG/ML
INJECTION INTRAVENOUS
Status: COMPLETED
Start: 2019-05-03 | End: 2019-05-03

## 2019-05-03 RX ORDER — NALOXONE HYDROCHLORIDE 0.4 MG/ML
0.4 INJECTION, SOLUTION INTRAMUSCULAR; INTRAVENOUS; SUBCUTANEOUS PRN
Status: DISCONTINUED | OUTPATIENT
Start: 2019-05-03 | End: 2019-05-05 | Stop reason: HOSPADM

## 2019-05-03 RX ORDER — TRISODIUM CITRATE DIHYDRATE AND CITRIC ACID MONOHYDRATE 500; 334 MG/5ML; MG/5ML
30 SOLUTION ORAL ONCE
Status: COMPLETED | OUTPATIENT
Start: 2019-05-03 | End: 2019-05-03

## 2019-05-03 RX ORDER — FERROUS SULFATE 325(65) MG
325 TABLET ORAL 2 TIMES DAILY WITH MEALS
Status: DISCONTINUED | OUTPATIENT
Start: 2019-05-03 | End: 2019-05-05 | Stop reason: HOSPADM

## 2019-05-03 RX ORDER — FENTANYL CITRATE 50 UG/ML
50 INJECTION, SOLUTION INTRAMUSCULAR; INTRAVENOUS ONCE
Status: DISCONTINUED | OUTPATIENT
Start: 2019-05-03 | End: 2019-05-05 | Stop reason: HOSPADM

## 2019-05-03 RX ORDER — OXYCODONE HYDROCHLORIDE AND ACETAMINOPHEN 5; 325 MG/1; MG/1
2 TABLET ORAL EVERY 4 HOURS PRN
Status: DISCONTINUED | OUTPATIENT
Start: 2019-05-04 | End: 2019-05-05 | Stop reason: HOSPADM

## 2019-05-03 RX ORDER — CEFAZOLIN SODIUM 1 G/50ML
1 SOLUTION INTRAVENOUS EVERY 8 HOURS
Status: COMPLETED | OUTPATIENT
Start: 2019-05-03 | End: 2019-05-03

## 2019-05-03 RX ORDER — METHYLERGONOVINE MALEATE 0.2 MG/ML
INJECTION INTRAVENOUS PRN
Status: DISCONTINUED | OUTPATIENT
Start: 2019-05-03 | End: 2019-05-03 | Stop reason: SDUPTHER

## 2019-05-03 RX ORDER — KETOROLAC TROMETHAMINE 30 MG/ML
15 INJECTION, SOLUTION INTRAMUSCULAR; INTRAVENOUS EVERY 6 HOURS
Status: DISCONTINUED | OUTPATIENT
Start: 2019-05-03 | End: 2019-05-03

## 2019-05-03 RX ORDER — KETOROLAC TROMETHAMINE 30 MG/ML
INJECTION, SOLUTION INTRAMUSCULAR; INTRAVENOUS PRN
Status: DISCONTINUED | OUTPATIENT
Start: 2019-05-03 | End: 2019-05-03 | Stop reason: SDUPTHER

## 2019-05-03 RX ORDER — SODIUM CHLORIDE, SODIUM LACTATE, POTASSIUM CHLORIDE, CALCIUM CHLORIDE 600; 310; 30; 20 MG/100ML; MG/100ML; MG/100ML; MG/100ML
INJECTION, SOLUTION INTRAVENOUS CONTINUOUS
Status: DISCONTINUED | OUTPATIENT
Start: 2019-05-03 | End: 2019-05-03 | Stop reason: SDUPTHER

## 2019-05-03 RX ORDER — SODIUM CHLORIDE 0.9 % (FLUSH) 0.9 %
10 SYRINGE (ML) INJECTION EVERY 12 HOURS SCHEDULED
Status: DISCONTINUED | OUTPATIENT
Start: 2019-05-03 | End: 2019-05-05 | Stop reason: HOSPADM

## 2019-05-03 RX ORDER — SODIUM CHLORIDE 0.9 % (FLUSH) 0.9 %
10 SYRINGE (ML) INJECTION PRN
Status: DISCONTINUED | OUTPATIENT
Start: 2019-05-03 | End: 2019-05-05 | Stop reason: HOSPADM

## 2019-05-03 RX ADMIN — SODIUM CHLORIDE, POTASSIUM CHLORIDE, SODIUM LACTATE AND CALCIUM CHLORIDE: 600; 310; 30; 20 INJECTION, SOLUTION INTRAVENOUS at 05:51

## 2019-05-03 RX ADMIN — MORPHINE SULFATE 0.15 MG: 1 INJECTION, SOLUTION EPIDURAL; INTRATHECAL; INTRAVENOUS at 07:17

## 2019-05-03 RX ADMIN — DEXAMETHASONE SODIUM PHOSPHATE 8 MG: 4 INJECTION, SOLUTION INTRA-ARTICULAR; INTRALESIONAL; INTRAMUSCULAR; INTRAVENOUS; SOFT TISSUE at 07:55

## 2019-05-03 RX ADMIN — Medication 999 ML/HR: at 07:35

## 2019-05-03 RX ADMIN — LIDOCAINE HYDROCHLORIDE 3 ML: 10 INJECTION, SOLUTION INFILTRATION; PERINEURAL at 07:17

## 2019-05-03 RX ADMIN — PHENYLEPHRINE HYDROCHLORIDE 200 MCG: 10 INJECTION INTRAVENOUS at 07:33

## 2019-05-03 RX ADMIN — ONDANSETRON HYDROCHLORIDE 4 MG: 2 INJECTION, SOLUTION INTRAMUSCULAR; INTRAVENOUS at 07:11

## 2019-05-03 RX ADMIN — Medication 10 ML: at 23:01

## 2019-05-03 RX ADMIN — Medication 2 G: at 07:05

## 2019-05-03 RX ADMIN — PHENYLEPHRINE HYDROCHLORIDE 200 MCG: 10 INJECTION INTRAVENOUS at 07:17

## 2019-05-03 RX ADMIN — PHENYLEPHRINE HYDROCHLORIDE 200 MCG: 10 INJECTION INTRAVENOUS at 08:03

## 2019-05-03 RX ADMIN — SODIUM CHLORIDE, POTASSIUM CHLORIDE, SODIUM LACTATE AND CALCIUM CHLORIDE: 600; 310; 30; 20 INJECTION, SOLUTION INTRAVENOUS at 07:59

## 2019-05-03 RX ADMIN — PROMETHAZINE HYDROCHLORIDE 10 MG: 25 INJECTION INTRAMUSCULAR; INTRAVENOUS at 07:55

## 2019-05-03 RX ADMIN — PHENYLEPHRINE HYDROCHLORIDE 100 MCG: 10 INJECTION INTRAVENOUS at 07:28

## 2019-05-03 RX ADMIN — PHENYLEPHRINE HYDROCHLORIDE 200 MCG: 10 INJECTION INTRAVENOUS at 07:42

## 2019-05-03 RX ADMIN — CEFAZOLIN SODIUM 1 G: 1 SOLUTION INTRAVENOUS at 16:15

## 2019-05-03 RX ADMIN — SODIUM CHLORIDE, POTASSIUM CHLORIDE, SODIUM LACTATE AND CALCIUM CHLORIDE: 600; 310; 30; 20 INJECTION, SOLUTION INTRAVENOUS at 06:48

## 2019-05-03 RX ADMIN — KETOROLAC TROMETHAMINE 30 MG: 30 INJECTION, SOLUTION INTRAMUSCULAR at 17:27

## 2019-05-03 RX ADMIN — METHYLERGONOVINE MALEATE 200 MCG: 0.2 INJECTION INTRAVENOUS at 07:35

## 2019-05-03 RX ADMIN — BUPIVACAINE HYDROCHLORIDE IN DEXTROSE 1.6 ML: 7.5 INJECTION, SOLUTION SUBARACHNOID at 07:17

## 2019-05-03 RX ADMIN — Medication 10 ML: at 20:47

## 2019-05-03 RX ADMIN — SODIUM CITRATE AND CITRIC ACID MONOHYDRATE 30 ML: 500; 334 SOLUTION ORAL at 06:24

## 2019-05-03 RX ADMIN — Medication: at 16:17

## 2019-05-03 RX ADMIN — HYDROCODONE BITARTRATE AND ACETAMINOPHEN 1 TABLET: 5; 325 TABLET ORAL at 10:29

## 2019-05-03 RX ADMIN — KETOROLAC TROMETHAMINE 30 MG: 30 INJECTION, SOLUTION INTRAMUSCULAR at 23:48

## 2019-05-03 RX ADMIN — Medication 10 ML: at 18:15

## 2019-05-03 RX ADMIN — PHENYLEPHRINE HYDROCHLORIDE 100 MCG: 10 INJECTION INTRAVENOUS at 07:22

## 2019-05-03 RX ADMIN — CEFAZOLIN SODIUM 1 G: 1 SOLUTION INTRAVENOUS at 23:01

## 2019-05-03 RX ADMIN — KETOROLAC TROMETHAMINE 30 MG: 30 INJECTION, SOLUTION INTRAMUSCULAR; INTRAVENOUS at 08:22

## 2019-05-03 RX ADMIN — PHENYLEPHRINE HYDROCHLORIDE 200 MCG: 10 INJECTION INTRAVENOUS at 07:47

## 2019-05-03 RX ADMIN — FAMOTIDINE 20 MG: 10 INJECTION, SOLUTION INTRAVENOUS at 16:11

## 2019-05-03 RX ADMIN — PHENYLEPHRINE HYDROCHLORIDE 100 MCG: 10 INJECTION INTRAVENOUS at 07:20

## 2019-05-03 RX ADMIN — Medication 10 ML: at 23:50

## 2019-05-03 RX ADMIN — PHENYLEPHRINE HYDROCHLORIDE 300 MCG: 10 INJECTION INTRAVENOUS at 07:38

## 2019-05-03 RX ADMIN — SODIUM CHLORIDE, POTASSIUM CHLORIDE, SODIUM LACTATE AND CALCIUM CHLORIDE: 600; 310; 30; 20 INJECTION, SOLUTION INTRAVENOUS at 10:36

## 2019-05-03 RX ADMIN — DOCUSATE SODIUM 100 MG: 100 CAPSULE, LIQUID FILLED ORAL at 20:47

## 2019-05-03 ASSESSMENT — PULMONARY FUNCTION TESTS
PIF_VALUE: 1
PIF_VALUE: 2
PIF_VALUE: 1
PIF_VALUE: 2
PIF_VALUE: 1
PIF_VALUE: 5
PIF_VALUE: 1
PIF_VALUE: 3
PIF_VALUE: 1

## 2019-05-03 ASSESSMENT — PAIN DESCRIPTION - DESCRIPTORS: DESCRIPTORS: SORE

## 2019-05-03 ASSESSMENT — PAIN SCALES - GENERAL
PAINLEVEL_OUTOF10: 4
PAINLEVEL_OUTOF10: 6
PAINLEVEL_OUTOF10: 2
PAINLEVEL_OUTOF10: 0

## 2019-05-03 ASSESSMENT — PAIN DESCRIPTION - LOCATION: LOCATION: INCISION

## 2019-05-03 ASSESSMENT — PAIN DESCRIPTION - RADICULAR PAIN: RADICULAR_PAIN: ABSENT

## 2019-05-03 ASSESSMENT — PAIN DESCRIPTION - PAIN TYPE: TYPE: SURGICAL PAIN

## 2019-05-03 NOTE — ANESTHESIA PRE PROCEDURE
Department of Anesthesiology  Preprocedure Note       Name:  Marlena Cullen   Age:  33 y.o.  :  1990                                          MRN:  [id number]         Date:  5/3/2019      Surgeon: Suzanne Mendez):  Stacie Reno MD    Procedure:  SECTION (N/A Uterus)    Medications prior to admission:   Prior to Admission medications    Medication Sig Start Date End Date Taking? Authorizing Provider   metFORMIN (GLUCOPHAGE-XR) 500 MG extended release tablet TAKE ONE TABLET BY MOUTH EVERY DAY 19   Stacie Reno MD   [id number]-FA-DHA w/oA [facility] [place]) 30-1.4-200 MG CPCR Take 1 tablet by mouth daily 18   CHRISTINA Meza CNM       Current medications:    No current facility-administered medications for this visit. No current outpatient medications on file.      Facility-Administered Medications Ordered in Other Visits   Medication Dose Route Frequency Provider Last Rate Last Dose    lactated ringers infusion   Intravenous Continuous Stacie Reno  mL/hr at 19 0648      naloxone [facility]) injection 0.4 mg  0.4 mg Intravenous PRN Pool Jaimes MD        ondansetron [facility]) injection 4 mg  4 mg Intravenous Q6H PRN Pool Jaimes MD        acetaminophen (TYLENOL) tablet 325 mg  325 mg Oral Q4H PRN Pool Jaimes MD        HYDROcodone-acetaminophen [facility]) 5-325 MG per tablet 1 tablet  1 tablet Oral Q4H PRSHANIA Jaimes MD   1 tablet at 19 1029    ketorolac (TORADOL) injection 15 mg  15 mg Intravenous Q6H Pool Jaimes MD        diphenhydrAMINE (BENADRYL) injection 25 mg  25 mg Intravenous Q6H PRN Pool Jaimes MD        lactated ringers infusion   Intravenous Continuous Stacie Reno  mL/hr at 19 1036      sodium chloride flush 0.9 % injection 10 mL  10 mL Intravenous 2 times per day Stacie Reno MD        sodium chloride flush 0.9 % injection 10 mL  10 mL Intravenous PRN Radha Herndon MD        famotidine (PEPCID) injection 20 mg  20 mg Intravenous BID Radha Herndon MD        oxytocin (PITOCIN) 30 units in 500 mL infusion  1 brandon-units/min Intravenous Continuous Radha Herndon MD        methylergonovine (METHERGINE) injection 200 mcg  200 mcg Intramuscular PRN Radha Herndon MD           Allergies:  No Known Allergies    Problem List:    Patient Active Problem List   Diagnosis Code    GBS (group B Streptococcus carrier), +RV culture, currently pregnant O99.820    Polycystic ovary affecting pregnancy, antepartum - on Metformin 500 q d O34.80, E28.2    Pregnancy with history of infertility (concieved with SHANE - Letrozole/HCG) O09.00    Abnormal fetal ultrasound - first trimester US with fluid in the subcutaneous fluid from head t rump - suspect cystic hygroma- resoved by 18 week Anatomy Scan  O28.3    Rh negative status during pregnancy O09.899, Z67.91    H/O postpartum hemorrhage both prior deliveries, currently pregnant O09.299    PCOS (polycystic ovarian syndrome) - on metformin 500 QD E28.2    Polyhydramnios per MFM O40. 9XX0    45 1/7 weeks gestation of pregnancy Z3A.38    High-risk pregnancy in third trimester - follows MFM Dr Kami June - NIPT testing normal 55 XX with MFM O09.92    Large for dates (MFM at 74% at 31 weeks; >90% at 35, 35 and 37 weeks 9#1oz) P08.1    Increased nuchal translucency space on fetal ultrasound (first trimester US with MFM) O28.3    Echogenic focus of heart of fetus affecting antepartum care of mother (first trimester US with MFM)  O35. 8XX0    Pyelectasis of fetus on prenatal ultrasound with MFM at 21 1/7 week visit 1/18/18 O35. 8XX0    Multigravida in third trimester Z34.83    Excessive fetal growth affecting management of mother in third trimester, antepartum O36.63X0       Past Medical History:        Diagnosis Date    Hemorrhoids, postpartum condition 6/21/2015    PCOS (polycystic ovarian syndrome) results found for: PHART, PO2ART, PWJ7GQT, HTG3YKG, BEART, D0ZIZKRB     Type & Screen (If Applicable):  No results found for: [facility]    Anesthesia Evaluation  Patient summary reviewed no history of anesthetic complications:   Airway: Mallampati: II  TM distance: >3 FB   Neck ROM: full  Mouth opening: > = 3 FB Dental: normal exam         Pulmonary:Negative Pulmonary ROS breath sounds clear to auscultation                             Cardiovascular:Negative CV ROS            Rhythm: regular  Rate: normal                    Neuro/Psych:   Negative Neuro/Psych ROS              GI/Hepatic/Renal: Neg GI/Hepatic/Renal ROS            Endo/Other:                      ROS comment: Polycystic ovary affecting pregnancy, antepartum - on Metformin 500 q d Abdominal:           Vascular: negative vascular ROS. Anesthesia Plan      spinal     ASA 2             Anesthetic plan and risks discussed with patient. Plan discussed with CRNA.                   Pretty Larry MD   5/3/2019

## 2019-05-03 NOTE — ANESTHESIA POSTPROCEDURE EVALUATION
Department of Anesthesiology  Postprocedure Note    Patient: Kavitha Elaine  MRN: [id number]  YOB: 1990  Date of evaluation: 5/3/2019  Time:  11:05 AM     Procedure Summary     Date:  19 Room / Location:  [facility] OR    Anesthesia Start:  711 Anesthesia Stop:  539    Procedure:   SECTION (N/A Uterus) Diagnosis:      Surgeon:  Louie Craig MD Responsible Provider:  Josey Newman MD    Anesthesia Type:  spinal ASA Status:  2          Anesthesia Type: No value filed. Robert Phase I: Robert Score: 10    Robert Phase II:      Last vitals: Reviewed and per EMR flowsheets.        Anesthesia Post Evaluation    Patient location during evaluation: PACU  Patient participation: complete - patient participated  Level of consciousness: awake  Airway patency: patent  Nausea & Vomiting: no nausea and no vomiting  Complications: no  Cardiovascular status: hemodynamically stable  Respiratory status: acceptable  Hydration status: stable

## 2019-05-03 NOTE — ANESTHESIA PROCEDURE NOTES
Spinal Block    Patient location during procedure: OR  Start time: 5/3/2019 7:14 AM  End time: 5/3/2019 7:16 AM  Reason for block: primary anesthetic  Staffing  Anesthesiologist: Kirby Lancaster MD  Resident/CRNA: Dom Owens RN  Other anesthesia staff: CHRISTINA Barajas CRNA  Performed: other anesthesia staff   Preanesthetic Checklist  Completed: patient identified, site marked, surgical consent, pre-op evaluation, timeout performed, IV checked, risks and benefits discussed, monitors and equipment checked, anesthesia consent given, oxygen available and patient being monitored  Spinal Block  Patient position: sitting  Prep: ChloraPrep  Patient monitoring: frequent blood pressure checks and continuous pulse ox  Approach: midline  Location: L3/L4  Provider prep: mask and sterile gown  Local infiltration: lidocaine  Agent: bupivacaine  Adjuvant: duramorph  Dose: 1.6  Dose: 1.6  Needle  Needle type: Pencan   Needle gauge: 25 G  Needle length: 3.5 in  Assessment  Sensory level: T4  Swirl obtained: Yes  CSF: clear  Attempts: 1  Hemodynamics: stable

## 2019-05-04 PROBLEM — D62 POSTOPERATIVE ANEMIA DUE TO ACUTE BLOOD LOSS: Status: ACTIVE | Noted: 2019-05-04

## 2019-05-04 LAB
ANISOCYTOSIS: ABNORMAL
BASOPHILS ABSOLUTE: 0 E9/L (ref 0–0.2)
BASOPHILS RELATIVE PERCENT: 0 % (ref 0–2)
EOSINOPHILS ABSOLUTE: 0.14 E9/L (ref 0.05–0.5)
EOSINOPHILS RELATIVE PERCENT: 0.9 % (ref 0–6)
FETAL SCREEN: NORMAL
HCT VFR BLD CALC: 31.8 % (ref 34–48)
HEMOGLOBIN: 10.3 G/DL (ref 11.5–15.5)
LYMPHOCYTES ABSOLUTE: 2.54 E9/L (ref 1.5–4)
LYMPHOCYTES RELATIVE PERCENT: 15.6 % (ref 20–42)
MCH RBC QN AUTO: 31.3 PG (ref 26–35)
MCHC RBC AUTO-ENTMCNC: 32.4 % (ref 32–34.5)
MCV RBC AUTO: 96.7 FL (ref 80–99.9)
MONOCYTES ABSOLUTE: 0.48 E9/L (ref 0.1–0.95)
MONOCYTES RELATIVE PERCENT: 2.6 % (ref 2–12)
NEUTROPHILS ABSOLUTE: 12.88 E9/L (ref 1.8–7.3)
NEUTROPHILS RELATIVE PERCENT: 80.9 % (ref 43–80)
NUCLEATED RED BLOOD CELLS: 0 /100 WBC
PDW BLD-RTO: 12.8 FL (ref 11.5–15)
PLATELET # BLD: 155 E9/L (ref 130–450)
PMV BLD AUTO: 9.7 FL (ref 7–12)
RBC # BLD: 3.29 E12/L (ref 3.5–5.5)
RHIG LOT NUMBER: NORMAL
WBC # BLD: 15.9 E9/L (ref 4.5–11.5)

## 2019-05-04 PROCEDURE — 94761 N-INVAS EAR/PLS OXIMETRY MLT: CPT

## 2019-05-04 PROCEDURE — 1220000000 HC SEMI PRIVATE OB R&B

## 2019-05-04 PROCEDURE — 85025 COMPLETE CBC W/AUTO DIFF WBC: CPT

## 2019-05-04 PROCEDURE — 85461 HEMOGLOBIN FETAL: CPT

## 2019-05-04 PROCEDURE — 2580000003 HC RX 258: Performed by: OBSTETRICS & GYNECOLOGY

## 2019-05-04 PROCEDURE — 6360000002 HC RX W HCPCS: Performed by: OBSTETRICS & GYNECOLOGY

## 2019-05-04 PROCEDURE — 36415 COLL VENOUS BLD VENIPUNCTURE: CPT

## 2019-05-04 PROCEDURE — 6370000000 HC RX 637 (ALT 250 FOR IP): Performed by: OBSTETRICS & GYNECOLOGY

## 2019-05-04 PROCEDURE — 96372 THER/PROPH/DIAG INJ SC/IM: CPT

## 2019-05-04 PROCEDURE — 2500000003 HC RX 250 WO HCPCS: Performed by: OBSTETRICS & GYNECOLOGY

## 2019-05-04 RX ADMIN — Medication 1 TABLET: at 08:51

## 2019-05-04 RX ADMIN — DOCUSATE SODIUM 100 MG: 100 CAPSULE, LIQUID FILLED ORAL at 20:16

## 2019-05-04 RX ADMIN — KETOROLAC TROMETHAMINE 30 MG: 30 INJECTION, SOLUTION INTRAMUSCULAR at 06:23

## 2019-05-04 RX ADMIN — Medication 10 ML: at 22:16

## 2019-05-04 RX ADMIN — Medication 10 ML: at 03:38

## 2019-05-04 RX ADMIN — Medication 10 ML: at 06:23

## 2019-05-04 RX ADMIN — DOCUSATE SODIUM 100 MG: 100 CAPSULE, LIQUID FILLED ORAL at 08:52

## 2019-05-04 RX ADMIN — OXYCODONE HYDROCHLORIDE AND ACETAMINOPHEN 2 TABLET: 5; 325 TABLET ORAL at 21:55

## 2019-05-04 RX ADMIN — FAMOTIDINE 20 MG: 10 INJECTION, SOLUTION INTRAVENOUS at 03:38

## 2019-05-04 RX ADMIN — HUMAN RHO(D) IMMUNE GLOBULIN 300 MCG: 300 INJECTION, SOLUTION INTRAMUSCULAR at 17:14

## 2019-05-04 RX ADMIN — IBUPROFEN 600 MG: 600 TABLET ORAL at 14:05

## 2019-05-04 RX ADMIN — METFORMIN HYDROCHLORIDE 500 MG: 500 TABLET, EXTENDED RELEASE ORAL at 19:22

## 2019-05-04 ASSESSMENT — PAIN DESCRIPTION - RADICULAR PAIN
RADICULAR_PAIN: ABSENT
RADICULAR_PAIN: ABSENT

## 2019-05-04 ASSESSMENT — PAIN DESCRIPTION - ONSET: ONSET: ON-GOING

## 2019-05-04 ASSESSMENT — PAIN DESCRIPTION - FREQUENCY: FREQUENCY: INTERMITTENT

## 2019-05-04 ASSESSMENT — PAIN DESCRIPTION - PROGRESSION
CLINICAL_PROGRESSION: GRADUALLY WORSENING
CLINICAL_PROGRESSION: GRADUALLY WORSENING

## 2019-05-04 ASSESSMENT — PAIN SCALES - GENERAL
PAINLEVEL_OUTOF10: 4
PAINLEVEL_OUTOF10: 0
PAINLEVEL_OUTOF10: 2
PAINLEVEL_OUTOF10: 5
PAINLEVEL_OUTOF10: 9

## 2019-05-04 ASSESSMENT — PAIN - FUNCTIONAL ASSESSMENT: PAIN_FUNCTIONAL_ASSESSMENT: ACTIVITIES ARE NOT PREVENTED

## 2019-05-04 ASSESSMENT — PAIN DESCRIPTION - LOCATION: LOCATION: OTHER (COMMENT)

## 2019-05-04 ASSESSMENT — PAIN DESCRIPTION - DESCRIPTORS: DESCRIPTORS: ACHING;CONSTANT;DISCOMFORT

## 2019-05-04 ASSESSMENT — PAIN DESCRIPTION - PAIN TYPE: TYPE: SURGICAL PAIN

## 2019-05-05 VITALS
WEIGHT: 184 LBS | BODY MASS INDEX: 32.6 KG/M2 | OXYGEN SATURATION: 98 % | RESPIRATION RATE: 14 BRPM | DIASTOLIC BLOOD PRESSURE: 63 MMHG | SYSTOLIC BLOOD PRESSURE: 115 MMHG | HEIGHT: 63 IN | HEART RATE: 88 BPM | TEMPERATURE: 98.8 F

## 2019-05-05 PROCEDURE — 6370000000 HC RX 637 (ALT 250 FOR IP): Performed by: OBSTETRICS & GYNECOLOGY

## 2019-05-05 RX ORDER — OXYCODONE HYDROCHLORIDE AND ACETAMINOPHEN 5; 325 MG/1; MG/1
1 TABLET ORAL EVERY 6 HOURS PRN
Qty: 20 TABLET | Refills: 0 | Status: SHIPPED | OUTPATIENT
Start: 2019-05-05 | End: 2019-05-10

## 2019-05-05 RX ORDER — IBUPROFEN 600 MG/1
600 TABLET ORAL EVERY 6 HOURS PRN
Qty: 30 TABLET | Refills: 0 | Status: SHIPPED | OUTPATIENT
Start: 2019-05-05 | End: 2019-06-14

## 2019-05-05 RX ORDER — LANOLIN 100 %
OINTMENT (GRAM) TOPICAL
COMMUNITY
Start: 2019-05-05 | End: 2019-06-14

## 2019-05-05 RX ORDER — PSEUDOEPHEDRINE HCL 30 MG
100 TABLET ORAL 2 TIMES DAILY PRN
COMMUNITY
Start: 2019-05-05 | End: 2019-06-14

## 2019-05-05 RX ADMIN — IBUPROFEN 600 MG: 600 TABLET ORAL at 11:47

## 2019-05-05 RX ADMIN — Medication: at 09:35

## 2019-05-05 RX ADMIN — DOCUSATE SODIUM 100 MG: 100 CAPSULE, LIQUID FILLED ORAL at 09:35

## 2019-05-05 RX ADMIN — IBUPROFEN 600 MG: 600 TABLET ORAL at 04:17

## 2019-05-05 ASSESSMENT — PAIN SCALES - GENERAL
PAINLEVEL_OUTOF10: 3
PAINLEVEL_OUTOF10: 3

## 2019-06-14 ENCOUNTER — HOSPITAL ENCOUNTER (OUTPATIENT)
Age: 29
Discharge: HOME | End: 2019-06-16
Payer: COMMERCIAL

## 2019-06-14 DIAGNOSIS — R30.0 DYSURIA: ICD-10-CM

## 2019-06-14 PROCEDURE — 87088 URINE BACTERIA CULTURE: CPT

## 2019-06-15 LAB — URINE CULTURE, ROUTINE: NORMAL

## 2019-11-04 ENCOUNTER — HOSPITAL ENCOUNTER (OUTPATIENT)
Age: 29
Discharge: HOME | End: 2019-11-06
Payer: COMMERCIAL

## 2019-11-04 PROCEDURE — 88175 CYTOPATH C/V AUTO FLUID REDO: CPT

## 2020-09-23 ENCOUNTER — HOSPITAL ENCOUNTER (OUTPATIENT)
Age: 30
Discharge: HOME | End: 2020-09-25
Payer: COMMERCIAL

## 2020-09-23 LAB
BACTERIA: ABNORMAL /HPF
BILIRUBIN URINE: NEGATIVE
BLOOD, URINE: NORMAL
CLARITY: CLEAR
COLOR: YELLOW
CRYSTALS, UA: ABNORMAL /HPF
GLUCOSE URINE: NEGATIVE MG/DL
KETONES, URINE: NEGATIVE MG/DL
LEUKOCYTE ESTERASE, URINE: NEGATIVE
NITRITE, URINE: NEGATIVE
PH UA: 6.5 (ref 5–9)
PROTEIN UA: NEGATIVE MG/DL
RBC UA: ABNORMAL /HPF (ref 0–2)
SPECIFIC GRAVITY UA: 1.02 (ref 1–1.03)
UROBILINOGEN, URINE: 0.2 E.U./DL
WBC UA: ABNORMAL /HPF (ref 0–5)

## 2020-09-23 PROCEDURE — 87088 URINE BACTERIA CULTURE: CPT

## 2020-09-23 PROCEDURE — 81001 URINALYSIS AUTO W/SCOPE: CPT

## 2020-09-25 LAB — URINE CULTURE, ROUTINE: NORMAL

## 2020-09-30 ENCOUNTER — HOSPITAL ENCOUNTER (OUTPATIENT)
Age: 30
Discharge: HOME | End: 2020-10-02
Payer: COMMERCIAL

## 2020-09-30 PROCEDURE — 87088 URINE BACTERIA CULTURE: CPT

## 2020-10-03 LAB — URINE CULTURE, ROUTINE: NORMAL

## 2020-10-08 ENCOUNTER — HOSPITAL ENCOUNTER (OUTPATIENT)
Age: 30
Discharge: HOME | End: 2020-10-10
Payer: COMMERCIAL

## 2020-10-08 LAB
HBA1C MFR BLD: 5.3 % (ref 4–5.6)
HCT VFR BLD CALC: 41.3 % (ref 34–48)
HEMOGLOBIN: 13.7 G/DL (ref 11.5–15.5)
MCH RBC QN AUTO: 30.2 PG (ref 26–35)
MCHC RBC AUTO-ENTMCNC: 33.2 % (ref 32–34.5)
MCV RBC AUTO: 91 FL (ref 80–99.9)
PDW BLD-RTO: 12.2 FL (ref 11.5–15)
PLATELET # BLD: 256 E9/L (ref 130–450)
PMV BLD AUTO: 9.7 FL (ref 7–12)
RBC # BLD: 4.54 E12/L (ref 3.5–5.5)
TSH SERPL DL<=0.05 MIU/L-ACNC: 0.65 UIU/ML (ref 0.27–4.2)
WBC # BLD: 11.2 E9/L (ref 4.5–11.5)

## 2020-10-08 PROCEDURE — 86803 HEPATITIS C AB TEST: CPT

## 2020-10-08 PROCEDURE — 86777 TOXOPLASMA ANTIBODY: CPT

## 2020-10-08 PROCEDURE — 87340 HEPATITIS B SURFACE AG IA: CPT

## 2020-10-08 PROCEDURE — 86787 VARICELLA-ZOSTER ANTIBODY: CPT

## 2020-10-08 PROCEDURE — 86703 HIV-1/HIV-2 1 RESULT ANTBDY: CPT

## 2020-10-08 PROCEDURE — 86901 BLOOD TYPING SEROLOGIC RH(D): CPT

## 2020-10-08 PROCEDURE — 86850 RBC ANTIBODY SCREEN: CPT

## 2020-10-08 PROCEDURE — 83036 HEMOGLOBIN GLYCOSYLATED A1C: CPT

## 2020-10-08 PROCEDURE — 84443 ASSAY THYROID STIM HORMONE: CPT

## 2020-10-08 PROCEDURE — 86900 BLOOD TYPING SEROLOGIC ABO: CPT

## 2020-10-08 PROCEDURE — 85027 COMPLETE CBC AUTOMATED: CPT

## 2020-10-08 PROCEDURE — 86592 SYPHILIS TEST NON-TREP QUAL: CPT

## 2020-10-08 PROCEDURE — 86778 TOXOPLASMA ANTIBODY IGM: CPT

## 2020-10-08 PROCEDURE — 86762 RUBELLA ANTIBODY: CPT

## 2020-10-09 LAB
ABO/RH: NORMAL
ANTIBODY SCREEN: NORMAL
HEPATITIS B SURFACE ANTIGEN INTERPRETATION: NORMAL
HEPATITIS C ANTIBODY INTERPRETATION: NORMAL
HIV-1 AND HIV-2 ANTIBODIES: NORMAL
RPR: NORMAL

## 2020-10-12 LAB — RUBELLA ANTIBODY IGG: NORMAL

## 2020-10-13 LAB — VARICELLA-ZOSTER VIRUS AB, IGG: NORMAL

## 2020-10-14 LAB
TOXOPLASMA IGM ANTIBODY: NORMAL
TOXOPLASMOSIS IGG AB: NORMAL

## 2020-12-02 DIAGNOSIS — O09.299 HISTORY OF MACROSOMIA IN INFANT IN PRIOR PREGNANCY, CURRENTLY PREGNANT: ICD-10-CM

## 2020-12-02 DIAGNOSIS — Z34.81 MULTIGRAVIDA IN FIRST TRIMESTER: ICD-10-CM

## 2020-12-02 LAB
GLUCOSE TOLERANCE TEST 1 HOUR: 160 MG/DL
GLUCOSE TOLERANCE TEST 2 HOUR: 95 MG/DL
GLUCOSE TOLERANCE TEST FASTING: 78 MG/DL

## 2020-12-06 LAB
AFP INTERPRETATION: NORMAL
AFP MOM: 1.33
AFP SPECIMEN: NORMAL
DATING: NORMAL
ESTIMATED DUE DATE: NORMAL
FETUS COUNT: NORMAL
GESTATIONAL AGE CALC AT COLLECT: NORMAL
HISTORY/NEURAL TUBE DEFECTS: NO
INSULIN DEP. DIABETIC: NO
MATERNAL AGE AT EDD: 29.6 YR
MATERNAL WEIGHT: NORMAL
PT AFP: 56 NG/ML
RACE: NORMAL
SMOKING: NO

## 2021-02-10 DIAGNOSIS — O09.42 HIGH RISK MULTIGRAVIDA, SECOND TRIMESTER: ICD-10-CM

## 2021-02-10 LAB
GLUCOSE TOLERANCE TEST 1 HOUR: 125 MG/DL
GLUCOSE TOLERANCE TEST 2 HOUR: 64 MG/DL
GLUCOSE TOLERANCE TEST FASTING: 73 MG/DL
HCT VFR BLD CALC: 38.8 % (ref 34–48)
HEMOGLOBIN: 12.7 G/DL (ref 11.5–15.5)
MCH RBC QN AUTO: 31.7 PG (ref 26–35)
MCHC RBC AUTO-ENTMCNC: 32.7 % (ref 32–34.5)
MCV RBC AUTO: 96.8 FL (ref 80–99.9)
PDW BLD-RTO: 12.7 FL (ref 11.5–15)
PLATELET # BLD: 187 E9/L (ref 130–450)
PMV BLD AUTO: 9.6 FL (ref 7–12)
RBC # BLD: 4.01 E12/L (ref 3.5–5.5)
WBC # BLD: 9.2 E9/L (ref 4.5–11.5)

## 2021-02-11 LAB
ABO/RH: NORMAL
ANTIBODY SCREEN: NORMAL

## 2021-02-12 ENCOUNTER — HOSPITAL ENCOUNTER (OUTPATIENT)
Dept: INFUSION THERAPY | Age: 31
Setting detail: INFUSION SERIES
Discharge: HOME | End: 2021-02-12
Payer: COMMERCIAL

## 2021-02-12 VITALS
SYSTOLIC BLOOD PRESSURE: 103 MMHG | RESPIRATION RATE: 16 BRPM | HEART RATE: 85 BPM | OXYGEN SATURATION: 97 % | BODY MASS INDEX: 31.35 KG/M2 | DIASTOLIC BLOOD PRESSURE: 59 MMHG | TEMPERATURE: 98.1 F | WEIGHT: 177 LBS

## 2021-02-12 LAB — RHIG LOT NUMBER: NORMAL

## 2021-02-12 PROCEDURE — 96372 THER/PROPH/DIAG INJ SC/IM: CPT

## 2021-02-12 PROCEDURE — 6360000002 HC RX W HCPCS: Performed by: OBSTETRICS & GYNECOLOGY

## 2021-02-12 RX ADMIN — HUMAN RHO(D) IMMUNE GLOBULIN 300 MCG: 300 INJECTION, SOLUTION INTRAMUSCULAR at 11:46

## 2021-03-24 DIAGNOSIS — O09.42 HIGH RISK MULTIGRAVIDA, SECOND TRIMESTER: ICD-10-CM

## 2021-03-24 LAB
HCT VFR BLD CALC: 42.3 % (ref 34–48)
HEMOGLOBIN: 13.3 G/DL (ref 11.5–15.5)
MCH RBC QN AUTO: 30.9 PG (ref 26–35)
MCHC RBC AUTO-ENTMCNC: 31.4 % (ref 32–34.5)
MCV RBC AUTO: 98.1 FL (ref 80–99.9)
PDW BLD-RTO: 13 FL (ref 11.5–15)
PLATELET # BLD: 176 E9/L (ref 130–450)
PMV BLD AUTO: 10 FL (ref 7–12)
RBC # BLD: 4.31 E12/L (ref 3.5–5.5)
WBC # BLD: 12.1 E9/L (ref 4.5–11.5)

## 2021-03-26 LAB
GROUP B STREP CULTURE: ABNORMAL
ORGANISM: ABNORMAL

## 2021-03-29 LAB
C TRACH DNA GENITAL QL NAA+PROBE: NEGATIVE
N. GONORRHOEAE DNA: NEGATIVE
SOURCE: NORMAL

## 2021-04-14 PROBLEM — Z34.83 MULTIGRAVIDA IN THIRD TRIMESTER: Status: RESOLVED | Noted: 2019-05-03 | Resolved: 2021-04-14

## 2021-04-14 PROBLEM — O34.219 DECLINES VBAC (VAGINAL BIRTH AFTER CESAREAN) TRIAL: Status: ACTIVE | Noted: 2021-04-14

## 2021-04-14 PROBLEM — O09.299 H/O POSTPARTUM HEMORRHAGE, CURRENTLY PREGNANT: Status: RESOLVED | Noted: 2017-04-05 | Resolved: 2021-04-14

## 2021-04-14 PROBLEM — Z67.91 RH NEGATIVE STATUS DURING PREGNANCY: Status: RESOLVED | Noted: 2017-04-05 | Resolved: 2021-04-14

## 2021-04-14 PROBLEM — D62 POSTOPERATIVE ANEMIA DUE TO ACUTE BLOOD LOSS: Status: RESOLVED | Noted: 2019-05-04 | Resolved: 2021-04-14

## 2021-04-14 PROBLEM — O35.BXX0 ECHOGENIC FOCUS OF HEART OF FETUS AFFECTING ANTEPARTUM CARE OF MOTHER: Status: RESOLVED | Noted: 2019-05-03 | Resolved: 2021-04-14

## 2021-04-14 PROBLEM — O09.42 HIGH RISK MULTIGRAVIDA, SECOND TRIMESTER: Status: ACTIVE | Noted: 2021-04-14

## 2021-04-14 PROBLEM — O26.899 RH NEGATIVE STATUS DURING PREGNANCY: Status: RESOLVED | Noted: 2017-04-05 | Resolved: 2021-04-14

## 2021-04-14 PROBLEM — O28.3 INCREASED NUCHAL TRANSLUCENCY SPACE ON FETAL ULTRASOUND: Status: RESOLVED | Noted: 2019-05-03 | Resolved: 2021-04-14

## 2021-04-14 PROBLEM — O34.219 PREVIOUS CESAREAN DELIVERY AFFECTING PREGNANCY: Status: ACTIVE | Noted: 2021-04-14

## 2021-04-14 PROBLEM — O09.92 HIGH-RISK PREGNANCY IN SECOND TRIMESTER: Status: RESOLVED | Noted: 2019-05-03 | Resolved: 2021-04-14

## 2021-04-14 PROBLEM — O40.9XX0 POLYHYDRAMNIOS: Status: RESOLVED | Noted: 2019-05-03 | Resolved: 2021-04-14

## 2021-04-14 PROBLEM — O09.00 PREGNANCY WITH HISTORY OF INFERTILITY: Status: RESOLVED | Noted: 2019-05-03 | Resolved: 2021-04-14

## 2021-04-14 PROBLEM — Z3A.38 38 WEEKS GESTATION OF PREGNANCY: Status: RESOLVED | Noted: 2019-05-02 | Resolved: 2021-04-14

## 2021-04-14 PROBLEM — O35.EXX0 PYELECTASIS OF FETUS ON PRENATAL ULTRASOUND: Status: RESOLVED | Noted: 2019-05-03 | Resolved: 2021-04-14

## 2021-04-14 PROBLEM — O09.299 HISTORY OF MACROSOMIA IN INFANT IN PRIOR PREGNANCY, CURRENTLY PREGNANT: Status: ACTIVE | Noted: 2021-04-14

## 2021-04-14 PROBLEM — E28.2 PCOS (POLYCYSTIC OVARIAN SYNDROME): Status: RESOLVED | Noted: 2017-04-05 | Resolved: 2021-04-14

## 2021-04-14 PROBLEM — O28.3 ABNORMAL FETAL ULTRASOUND: Status: RESOLVED | Noted: 2019-05-03 | Resolved: 2021-04-14

## 2021-04-21 PROBLEM — E28.2 POLYCYSTIC OVARY AFFECTING PREGNANCY, ANTEPARTUM: Status: ACTIVE | Noted: 2021-04-21

## 2021-04-21 PROBLEM — O34.03 BICORNUATE UTERUS AFFECTING PREGNANCY, ANTEPARTUM, THIRD TRIMESTER: Status: ACTIVE | Noted: 2021-04-21

## 2021-04-21 PROBLEM — O26.891 RH NEGATIVE STATUS DURING PREGNANCY IN FIRST TRIMESTER: Status: ACTIVE | Noted: 2021-04-21

## 2021-04-21 PROBLEM — O34.80 POLYCYSTIC OVARY AFFECTING PREGNANCY, ANTEPARTUM: Status: ACTIVE | Noted: 2021-04-21

## 2021-04-21 PROBLEM — O09.299 H/O POSTPARTUM HEMORRHAGE, CURRENTLY PREGNANT: Status: ACTIVE | Noted: 2021-04-21

## 2021-04-21 PROBLEM — Z67.91 RH NEGATIVE STATUS DURING PREGNANCY IN FIRST TRIMESTER: Status: ACTIVE | Noted: 2021-04-21

## 2021-04-21 PROBLEM — Q51.3 BICORNUATE UTERUS AFFECTING PREGNANCY, ANTEPARTUM, THIRD TRIMESTER: Status: ACTIVE | Noted: 2021-04-21

## 2021-04-27 ENCOUNTER — HOSPITAL ENCOUNTER (INPATIENT)
Age: 31
LOS: 2 days | Discharge: HOME | End: 2021-04-29
Attending: OBSTETRICS & GYNECOLOGY | Admitting: OBSTETRICS & GYNECOLOGY
Payer: COMMERCIAL

## 2021-04-27 ENCOUNTER — ANESTHESIA EVENT (OUTPATIENT)
Dept: LABOR AND DELIVERY | Age: 31
End: 2021-04-27
Payer: COMMERCIAL

## 2021-04-27 ENCOUNTER — ANESTHESIA (OUTPATIENT)
Dept: LABOR AND DELIVERY | Age: 31
End: 2021-04-27
Payer: COMMERCIAL

## 2021-04-27 VITALS — OXYGEN SATURATION: 98 % | DIASTOLIC BLOOD PRESSURE: 56 MMHG | SYSTOLIC BLOOD PRESSURE: 113 MMHG

## 2021-04-27 DIAGNOSIS — G89.18 POSTOPERATIVE PAIN: ICD-10-CM

## 2021-04-27 PROBLEM — Z3A.39 39 WEEKS GESTATION OF PREGNANCY: Status: ACTIVE | Noted: 2021-04-27

## 2021-04-27 LAB
ABO/RH: NORMAL
ABO/RH: NORMAL
AMPHETAMINE SCREEN, URINE: NOT DETECTED
ANTIBODY IDENTIFICATION: NORMAL
ANTIBODY SCREEN: NORMAL
BARBITURATE SCREEN URINE: NOT DETECTED
BENZODIAZEPINE SCREEN, URINE: NOT DETECTED
CANNABINOID SCREEN URINE: NOT DETECTED
COCAINE METABOLITE SCREEN URINE: NOT DETECTED
DAT POLYSPECIFIC: NORMAL
FENTANYL SCREEN, URINE: NOT DETECTED
HCT VFR BLD CALC: 42 % (ref 34–48)
HEMOGLOBIN: 14 G/DL (ref 11.5–15.5)
Lab: NORMAL
MCH RBC QN AUTO: 31.3 PG (ref 26–35)
MCHC RBC AUTO-ENTMCNC: 33.3 % (ref 32–34.5)
MCV RBC AUTO: 94 FL (ref 80–99.9)
METHADONE SCREEN, URINE: NOT DETECTED
OPIATE SCREEN URINE: NOT DETECTED
OXYCODONE URINE: NOT DETECTED
PDW BLD-RTO: 12.7 FL (ref 11.5–15)
PHENCYCLIDINE SCREEN URINE: NOT DETECTED
PLATELET # BLD: 229 E9/L (ref 130–450)
PMV BLD AUTO: 10.1 FL (ref 7–12)
RBC # BLD: 4.47 E12/L (ref 3.5–5.5)
WBC # BLD: 11 E9/L (ref 4.5–11.5)

## 2021-04-27 PROCEDURE — 6360000002 HC RX W HCPCS

## 2021-04-27 PROCEDURE — 94761 N-INVAS EAR/PLS OXIMETRY MLT: CPT

## 2021-04-27 PROCEDURE — 86901 BLOOD TYPING SEROLOGIC RH(D): CPT

## 2021-04-27 PROCEDURE — 6370000000 HC RX 637 (ALT 250 FOR IP): Performed by: OBSTETRICS & GYNECOLOGY

## 2021-04-27 PROCEDURE — 86880 COOMBS TEST DIRECT: CPT

## 2021-04-27 PROCEDURE — 6360000002 HC RX W HCPCS: Performed by: OBSTETRICS & GYNECOLOGY

## 2021-04-27 PROCEDURE — 2709999900 HC NON-CHARGEABLE SUPPLY: Performed by: OBSTETRICS & GYNECOLOGY

## 2021-04-27 PROCEDURE — 86850 RBC ANTIBODY SCREEN: CPT

## 2021-04-27 PROCEDURE — 2580000003 HC RX 258: Performed by: OBSTETRICS & GYNECOLOGY

## 2021-04-27 PROCEDURE — 3700000001 HC ADD 15 MINUTES (ANESTHESIA): Performed by: OBSTETRICS & GYNECOLOGY

## 2021-04-27 PROCEDURE — 2500000003 HC RX 250 WO HCPCS: Performed by: OBSTETRICS & GYNECOLOGY

## 2021-04-27 PROCEDURE — 6370000000 HC RX 637 (ALT 250 FOR IP)

## 2021-04-27 PROCEDURE — 1220000000 HC SEMI PRIVATE OB R&B

## 2021-04-27 PROCEDURE — 2580000003 HC RX 258

## 2021-04-27 PROCEDURE — 7100000001 HC PACU RECOVERY - ADDTL 15 MIN: Performed by: OBSTETRICS & GYNECOLOGY

## 2021-04-27 PROCEDURE — 3609079900 HC CESAREAN SECTION: Performed by: OBSTETRICS & GYNECOLOGY

## 2021-04-27 PROCEDURE — 2500000003 HC RX 250 WO HCPCS

## 2021-04-27 PROCEDURE — 36415 COLL VENOUS BLD VENIPUNCTURE: CPT

## 2021-04-27 PROCEDURE — 85027 COMPLETE CBC AUTOMATED: CPT

## 2021-04-27 PROCEDURE — 86870 RBC ANTIBODY IDENTIFICATION: CPT

## 2021-04-27 PROCEDURE — 80307 DRUG TEST PRSMV CHEM ANLYZR: CPT

## 2021-04-27 PROCEDURE — 7100000000 HC PACU RECOVERY - FIRST 15 MIN: Performed by: OBSTETRICS & GYNECOLOGY

## 2021-04-27 PROCEDURE — 3700000000 HC ANESTHESIA ATTENDED CARE: Performed by: OBSTETRICS & GYNECOLOGY

## 2021-04-27 PROCEDURE — 86900 BLOOD TYPING SEROLOGIC ABO: CPT

## 2021-04-27 RX ORDER — ACETAMINOPHEN 325 MG/1
650 TABLET ORAL EVERY 4 HOURS PRN
Status: DISCONTINUED | OUTPATIENT
Start: 2021-04-27 | End: 2021-04-29 | Stop reason: HOSPADM

## 2021-04-27 RX ORDER — ONDANSETRON 2 MG/ML
INJECTION INTRAMUSCULAR; INTRAVENOUS PRN
Status: DISCONTINUED | OUTPATIENT
Start: 2021-04-27 | End: 2021-04-27 | Stop reason: SDUPTHER

## 2021-04-27 RX ORDER — BISACODYL 10 MG
10 SUPPOSITORY, RECTAL RECTAL DAILY PRN
Status: DISCONTINUED | OUTPATIENT
Start: 2021-04-29 | End: 2021-04-29 | Stop reason: HOSPADM

## 2021-04-27 RX ORDER — TRISODIUM CITRATE DIHYDRATE AND CITRIC ACID MONOHYDRATE 500; 334 MG/5ML; MG/5ML
SOLUTION ORAL
Status: COMPLETED
Start: 2021-04-27 | End: 2021-04-27

## 2021-04-27 RX ORDER — SODIUM CHLORIDE, SODIUM LACTATE, POTASSIUM CHLORIDE, CALCIUM CHLORIDE 600; 310; 30; 20 MG/100ML; MG/100ML; MG/100ML; MG/100ML
INJECTION, SOLUTION INTRAVENOUS CONTINUOUS PRN
Status: DISCONTINUED | OUTPATIENT
Start: 2021-04-27 | End: 2021-04-27 | Stop reason: SDUPTHER

## 2021-04-27 RX ORDER — IBUPROFEN 600 MG/1
600 TABLET ORAL EVERY 6 HOURS PRN
Status: DISCONTINUED | OUTPATIENT
Start: 2021-04-28 | End: 2021-04-29 | Stop reason: HOSPADM

## 2021-04-27 RX ORDER — TRISODIUM CITRATE DIHYDRATE AND CITRIC ACID MONOHYDRATE 500; 334 MG/5ML; MG/5ML
30 SOLUTION ORAL ONCE
Status: COMPLETED | OUTPATIENT
Start: 2021-04-27 | End: 2021-04-27

## 2021-04-27 RX ORDER — PHENYLEPHRINE HYDROCHLORIDE 10 MG/ML
INJECTION INTRAVENOUS PRN
Status: DISCONTINUED | OUTPATIENT
Start: 2021-04-27 | End: 2021-04-27 | Stop reason: SDUPTHER

## 2021-04-27 RX ORDER — BUPIVACAINE HYDROCHLORIDE 7.5 MG/ML
INJECTION, SOLUTION INTRASPINAL PRN
Status: DISCONTINUED | OUTPATIENT
Start: 2021-04-27 | End: 2021-04-27 | Stop reason: SDUPTHER

## 2021-04-27 RX ORDER — OXYCODONE HYDROCHLORIDE 5 MG/1
10 TABLET ORAL EVERY 4 HOURS PRN
Status: DISCONTINUED | OUTPATIENT
Start: 2021-04-27 | End: 2021-04-29 | Stop reason: HOSPADM

## 2021-04-27 RX ORDER — SODIUM CHLORIDE 0.9 % (FLUSH) 0.9 %
5-40 SYRINGE (ML) INJECTION PRN
Status: DISCONTINUED | OUTPATIENT
Start: 2021-04-27 | End: 2021-04-29 | Stop reason: HOSPADM

## 2021-04-27 RX ORDER — SIMETHICONE 80 MG
80 TABLET,CHEWABLE ORAL EVERY 6 HOURS PRN
Status: DISCONTINUED | OUTPATIENT
Start: 2021-04-27 | End: 2021-04-29 | Stop reason: HOSPADM

## 2021-04-27 RX ORDER — SODIUM CHLORIDE, SODIUM LACTATE, POTASSIUM CHLORIDE, AND CALCIUM CHLORIDE .6; .31; .03; .02 G/100ML; G/100ML; G/100ML; G/100ML
1000 INJECTION, SOLUTION INTRAVENOUS ONCE
Status: DISCONTINUED | OUTPATIENT
Start: 2021-04-27 | End: 2021-04-27

## 2021-04-27 RX ORDER — OXYCODONE HYDROCHLORIDE AND ACETAMINOPHEN 5; 325 MG/1; MG/1
2 TABLET ORAL EVERY 4 HOURS PRN
Status: DISCONTINUED | OUTPATIENT
Start: 2021-04-28 | End: 2021-04-29 | Stop reason: HOSPADM

## 2021-04-27 RX ORDER — SODIUM CHLORIDE 0.9 % (FLUSH) 0.9 %
5-40 SYRINGE (ML) INJECTION EVERY 12 HOURS SCHEDULED
Status: DISCONTINUED | OUTPATIENT
Start: 2021-04-27 | End: 2021-04-29 | Stop reason: HOSPADM

## 2021-04-27 RX ORDER — NALOXONE HYDROCHLORIDE 0.4 MG/ML
0.4 INJECTION, SOLUTION INTRAMUSCULAR; INTRAVENOUS; SUBCUTANEOUS PRN
Status: DISCONTINUED | OUTPATIENT
Start: 2021-04-27 | End: 2021-04-29 | Stop reason: HOSPADM

## 2021-04-27 RX ORDER — SODIUM CHLORIDE, SODIUM LACTATE, POTASSIUM CHLORIDE, CALCIUM CHLORIDE 600; 310; 30; 20 MG/100ML; MG/100ML; MG/100ML; MG/100ML
INJECTION, SOLUTION INTRAVENOUS CONTINUOUS
Status: DISCONTINUED | OUTPATIENT
Start: 2021-04-27 | End: 2021-04-29 | Stop reason: HOSPADM

## 2021-04-27 RX ORDER — FERROUS SULFATE 325(65) MG
325 TABLET ORAL 2 TIMES DAILY WITH MEALS
Status: DISCONTINUED | OUTPATIENT
Start: 2021-04-27 | End: 2021-04-29 | Stop reason: HOSPADM

## 2021-04-27 RX ORDER — KETOROLAC TROMETHAMINE 30 MG/ML
30 INJECTION, SOLUTION INTRAMUSCULAR; INTRAVENOUS EVERY 6 HOURS
Status: COMPLETED | OUTPATIENT
Start: 2021-04-27 | End: 2021-04-28

## 2021-04-27 RX ORDER — PROMETHAZINE HYDROCHLORIDE 25 MG/ML
INJECTION, SOLUTION INTRAMUSCULAR; INTRAVENOUS PRN
Status: DISCONTINUED | OUTPATIENT
Start: 2021-04-27 | End: 2021-04-27 | Stop reason: SDUPTHER

## 2021-04-27 RX ORDER — SODIUM CHLORIDE 9 MG/ML
25 INJECTION, SOLUTION INTRAVENOUS PRN
Status: DISCONTINUED | OUTPATIENT
Start: 2021-04-27 | End: 2021-04-29 | Stop reason: HOSPADM

## 2021-04-27 RX ORDER — FENTANYL CITRATE 50 UG/ML
INJECTION, SOLUTION INTRAMUSCULAR; INTRAVENOUS PRN
Status: DISCONTINUED | OUTPATIENT
Start: 2021-04-27 | End: 2021-04-27 | Stop reason: SDUPTHER

## 2021-04-27 RX ORDER — MODIFIED LANOLIN
OINTMENT (GRAM) TOPICAL
Status: DISCONTINUED | OUTPATIENT
Start: 2021-04-27 | End: 2021-04-29 | Stop reason: HOSPADM

## 2021-04-27 RX ORDER — MORPHINE SULFATE 1 MG/ML
INJECTION, SOLUTION EPIDURAL; INTRATHECAL; INTRAVENOUS PRN
Status: DISCONTINUED | OUTPATIENT
Start: 2021-04-27 | End: 2021-04-27 | Stop reason: SDUPTHER

## 2021-04-27 RX ORDER — PRENATAL WITH FERROUS FUM AND FOLIC ACID 3080; 920; 120; 400; 22; 1.84; 3; 20; 10; 1; 12; 200; 27; 25; 2 [IU]/1; [IU]/1; MG/1; [IU]/1; MG/1; MG/1; MG/1; MG/1; MG/1; MG/1; UG/1; MG/1; MG/1; MG/1; MG/1
1 TABLET ORAL
Status: DISCONTINUED | OUTPATIENT
Start: 2021-04-28 | End: 2021-04-29 | Stop reason: HOSPADM

## 2021-04-27 RX ORDER — ONDANSETRON 2 MG/ML
4 INJECTION INTRAMUSCULAR; INTRAVENOUS EVERY 6 HOURS PRN
Status: DISCONTINUED | OUTPATIENT
Start: 2021-04-27 | End: 2021-04-29 | Stop reason: HOSPADM

## 2021-04-27 RX ORDER — DOCUSATE SODIUM 100 MG/1
100 CAPSULE, LIQUID FILLED ORAL 2 TIMES DAILY
Status: DISCONTINUED | OUTPATIENT
Start: 2021-04-27 | End: 2021-04-29 | Stop reason: HOSPADM

## 2021-04-27 RX ORDER — OXYCODONE HYDROCHLORIDE 5 MG/1
5 TABLET ORAL EVERY 4 HOURS PRN
Status: DISCONTINUED | OUTPATIENT
Start: 2021-04-27 | End: 2021-04-29 | Stop reason: HOSPADM

## 2021-04-27 RX ORDER — DIPHENHYDRAMINE HYDROCHLORIDE 50 MG/ML
25 INJECTION INTRAMUSCULAR; INTRAVENOUS EVERY 6 HOURS PRN
Status: DISCONTINUED | OUTPATIENT
Start: 2021-04-27 | End: 2021-04-29 | Stop reason: HOSPADM

## 2021-04-27 RX ORDER — OXYCODONE HYDROCHLORIDE AND ACETAMINOPHEN 5; 325 MG/1; MG/1
1 TABLET ORAL EVERY 4 HOURS PRN
Status: DISCONTINUED | OUTPATIENT
Start: 2021-04-28 | End: 2021-04-29 | Stop reason: HOSPADM

## 2021-04-27 RX ORDER — SODIUM CHLORIDE, SODIUM LACTATE, POTASSIUM CHLORIDE, CALCIUM CHLORIDE 600; 310; 30; 20 MG/100ML; MG/100ML; MG/100ML; MG/100ML
INJECTION, SOLUTION INTRAVENOUS CONTINUOUS
Status: DISCONTINUED | OUTPATIENT
Start: 2021-04-27 | End: 2021-04-27

## 2021-04-27 RX ADMIN — PHENYLEPHRINE HYDROCHLORIDE 100 MCG: 10 INJECTION INTRAVENOUS at 07:18

## 2021-04-27 RX ADMIN — FAMOTIDINE 20 MG: 10 INJECTION, SOLUTION INTRAVENOUS at 17:09

## 2021-04-27 RX ADMIN — PHENYLEPHRINE HYDROCHLORIDE 100 MCG: 10 INJECTION INTRAVENOUS at 07:14

## 2021-04-27 RX ADMIN — FENTANYL CITRATE 20 MCG: 50 INJECTION, SOLUTION INTRAMUSCULAR; INTRAVENOUS at 07:12

## 2021-04-27 RX ADMIN — TRISODIUM CITRATE DIHYDRATE AND CITRIC ACID MONOHYDRATE 30 ML: 500; 334 SOLUTION ORAL at 06:45

## 2021-04-27 RX ADMIN — SODIUM CHLORIDE, PRESERVATIVE FREE 10 ML: 5 INJECTION INTRAVENOUS at 21:55

## 2021-04-27 RX ADMIN — PROMETHAZINE HYDROCHLORIDE 12.5 MG: 25 INJECTION INTRAMUSCULAR; INTRAVENOUS at 07:39

## 2021-04-27 RX ADMIN — PHENYLEPHRINE HYDROCHLORIDE 100 MCG: 10 INJECTION INTRAVENOUS at 07:50

## 2021-04-27 RX ADMIN — ONDANSETRON 4 MG: 2 INJECTION INTRAMUSCULAR; INTRAVENOUS at 07:20

## 2021-04-27 RX ADMIN — MORPHINE SULFATE 0.15 MG: 1 INJECTION, SOLUTION EPIDURAL; INTRATHECAL; INTRAVENOUS at 07:12

## 2021-04-27 RX ADMIN — SODIUM CHLORIDE, POTASSIUM CHLORIDE, SODIUM LACTATE AND CALCIUM CHLORIDE: 600; 310; 30; 20 INJECTION, SOLUTION INTRAVENOUS at 11:42

## 2021-04-27 RX ADMIN — KETOROLAC TROMETHAMINE 30 MG: 30 INJECTION, SOLUTION INTRAMUSCULAR; INTRAVENOUS at 16:42

## 2021-04-27 RX ADMIN — DOCUSATE SODIUM 100 MG: 100 CAPSULE, LIQUID FILLED ORAL at 21:55

## 2021-04-27 RX ADMIN — SIMETHICONE 80 MG: 80 TABLET, CHEWABLE ORAL at 21:55

## 2021-04-27 RX ADMIN — SODIUM CITRATE AND CITRIC ACID MONOHYDRATE 30 ML: 500; 334 SOLUTION ORAL at 06:45

## 2021-04-27 RX ADMIN — Medication 999 ML/HR: at 07:28

## 2021-04-27 RX ADMIN — BUPIVACAINE HYDROCHLORIDE IN DEXTROSE 1.6 ML: 7.5 INJECTION, SOLUTION SUBARACHNOID at 07:12

## 2021-04-27 RX ADMIN — SODIUM CHLORIDE, POTASSIUM CHLORIDE, SODIUM LACTATE AND CALCIUM CHLORIDE: 600; 310; 30; 20 INJECTION, SOLUTION INTRAVENOUS at 07:38

## 2021-04-27 RX ADMIN — PHENYLEPHRINE HYDROCHLORIDE 100 MCG: 10 INJECTION INTRAVENOUS at 07:46

## 2021-04-27 RX ADMIN — KETOROLAC TROMETHAMINE 30 MG: 30 INJECTION, SOLUTION INTRAMUSCULAR; INTRAVENOUS at 09:56

## 2021-04-27 RX ADMIN — DIPHENHYDRAMINE HYDROCHLORIDE 25 MG: 50 INJECTION, SOLUTION INTRAMUSCULAR; INTRAVENOUS at 11:52

## 2021-04-27 RX ADMIN — CEFAZOLIN 1000 MG: 1 INJECTION, POWDER, FOR SOLUTION INTRAMUSCULAR; INTRAVENOUS at 15:48

## 2021-04-27 RX ADMIN — PHENYLEPHRINE HYDROCHLORIDE 100 MCG: 10 INJECTION INTRAVENOUS at 07:43

## 2021-04-27 RX ADMIN — PHENYLEPHRINE HYDROCHLORIDE 100 MCG: 10 INJECTION INTRAVENOUS at 07:26

## 2021-04-27 RX ADMIN — CEFAZOLIN 1000 MG: 1 INJECTION, POWDER, FOR SOLUTION INTRAMUSCULAR; INTRAVENOUS at 23:47

## 2021-04-27 RX ADMIN — Medication 2000 MG: at 07:00

## 2021-04-27 RX ADMIN — PHENYLEPHRINE HYDROCHLORIDE 200 MCG: 10 INJECTION INTRAVENOUS at 07:23

## 2021-04-27 RX ADMIN — SODIUM CHLORIDE, POTASSIUM CHLORIDE, SODIUM LACTATE AND CALCIUM CHLORIDE: 600; 310; 30; 20 INJECTION, SOLUTION INTRAVENOUS at 07:01

## 2021-04-27 RX ADMIN — KETOROLAC TROMETHAMINE 30 MG: 30 INJECTION, SOLUTION INTRAMUSCULAR; INTRAVENOUS at 23:47

## 2021-04-27 ASSESSMENT — PULMONARY FUNCTION TESTS
PIF_VALUE: 1
PIF_VALUE: 0
PIF_VALUE: 0
PIF_VALUE: 1
PIF_VALUE: 0
PIF_VALUE: 1
PIF_VALUE: 0
PIF_VALUE: 1
PIF_VALUE: 0
PIF_VALUE: 0
PIF_VALUE: 1
PIF_VALUE: 1
PIF_VALUE: 0
PIF_VALUE: 1
PIF_VALUE: 1
PIF_VALUE: 0
PIF_VALUE: 0
PIF_VALUE: 1
PIF_VALUE: 1
PIF_VALUE: 0
PIF_VALUE: 1
PIF_VALUE: 1
PIF_VALUE: 0
PIF_VALUE: 1
PIF_VALUE: 1
PIF_VALUE: 0
PIF_VALUE: 1

## 2021-04-27 ASSESSMENT — PAIN DESCRIPTION - PAIN TYPE: TYPE: ACUTE PAIN;SURGICAL PAIN

## 2021-04-27 ASSESSMENT — PAIN DESCRIPTION - FREQUENCY
FREQUENCY: INTERMITTENT
FREQUENCY: INTERMITTENT

## 2021-04-27 ASSESSMENT — PAIN DESCRIPTION - ORIENTATION: ORIENTATION: LOWER

## 2021-04-27 ASSESSMENT — PAIN SCALES - GENERAL
PAINLEVEL_OUTOF10: 3
PAINLEVEL_OUTOF10: 0

## 2021-04-27 ASSESSMENT — PAIN DESCRIPTION - LOCATION
LOCATION: ABDOMEN
LOCATION: ABDOMEN;INCISION

## 2021-04-27 ASSESSMENT — PAIN DESCRIPTION - DESCRIPTORS: DESCRIPTORS: DISCOMFORT;SORE

## 2021-04-27 NOTE — ANESTHESIA PRE PROCEDURE
Department of Anesthesiology  Preprocedure Note       Name:  Kayla Cortes   Age:  35 y.o.  :  1990                                          MRN:  [id number]         Date:  2021      Surgeon: Julio Bragg):  Viola Hayes MD    Procedure: Procedure(s):   SECTION    Medications prior to admission:   Prior to Admission medications    Medication Sig Start Date End Date Taking?  Authorizing Provider   vitamin B-6 (B-6) 25 MG tablet Take 1 tablet by mouth nightly as needed (nausea) 20  Yes CHRISTINA Joshi CNP   Prenatal Vit-Fe Fumarate-FA (PRENATAL VITAMIN) 27-1 MG TABS tablet Take 1 tablet by mouth daily 20  Yes CHRISTINA Jimenez CNP       Current medications:    Current Facility-Administered Medications   Medication Dose Route Frequency Provider Last Rate Last Admin    lactated ringers infusion   Intravenous Continuous Viola Hayes MD        lactated ringers bolus  1,000 mL Intravenous Once Viola Hayes MD        citric acid-sodium citrate (BICITRA) solution 30 mL  30 mL Oral Once Viola Hayes MD        ceFAZolin (ANCEF) 2000 mg in sterile water 20 mL IV syringe  2,000 mg Intravenous Once Viola Hayes MD        oxytocin (PITOCIN) 10 unit bolus from the bag  10 Units Intravenous PRN Viola Hayes MD        And    oxytocin (PITOCIN) 30 units in 500 mL infusion  87.3 brandon-units/min Intravenous Continuous PRN Viola Hayes MD        citric acid-sodium citrate (BICITRA) 500-334 MG/5ML solution             oxytocin (PITOCIN) 30 units in 500 mL infusion Override Pull             ceFAZolin 2000 mg in 20 mL SWFI IV Syringe IV syringe                Allergies:  No Known Allergies    Problem List:    Patient Active Problem List   Diagnosis Code    Group B Streptococcus carrier, +RV culture, currently pregnant O99.820    High risk multigravida, third trimester O09.42    Previous  delivery affecting pregnancy x1- Scheduled repeat C/S 21 at 0700 O34.219    Declines  (vaginal birth after ) trial O34.219    History of macrosomia in infant in prior pregnancy, currently pregnant (10#1.9 oz at 38 weeks)- passed early 2hr GTT. Start EFW at 32 weeks. O09.299    Rh negative status during pregnancy in third trimester-rhogam done O26.891, Z67.91    Polycystic ovary affecting pregnancy, antepartum - on Metformin 500mg BID O34.80, E28.2    H/O postpartum hemorrhage (in pregnacy #1 and #2, NOT #3 the C/S), currently pregnant O09.299    Bicornuate uterus affecting pregnancy, antepartum, third trimester O34.03, Q51.3    39 weeks gestation of pregnancy Z3A.39       Past Medical History:        Diagnosis Date    Hemorrhoids, postpartum condition 2015    PCOS (polycystic ovarian syndrome)     Postoperative anemia due to acute blood loss 2019    Rh negative, maternal 2015       Past Surgical History:        Procedure Laterality Date    APPENDECTOMY       SECTION N/A 5/3/2019     SECTION [facility]    DILATION AND CURETTAGE OF UTERUS  2017    ENDOMETRIAL BIOPSY  2017    POLYPECTOMY  2017    POLYPECTOMY  2017    D&C    TONSILLECTOMY         Social History:    Social History     Tobacco Use    Smoking status: Never Smoker    Smokeless tobacco: Never Used   Substance Use Topics    Alcohol use:  No                                Counseling given: Not Answered      Vital Signs (Current):   Vitals:    21 0515   BP: 120/72   Pulse: 90   Resp: 18   Temp: 37.3 °C (99.2 °F)   TempSrc: Oral   Weight: 193 lb (87.5 kg)   Height: 5' 3\" (1.6 m)                                              BP Readings from Last 3 Encounters:   21 120/72   21 114/74   21 110/76       NPO Status: Time of last liquid consumption: 2300                        Time of last solid consumption: 2100                        Date of last liquid consumption: 04/26/21                        Date of last solid food consumption: 04/26/21    BMI:   Wt Readings from Last 3 Encounters:   04/27/21 193 lb (87.5 kg)   04/21/21 193 lb (87.5 kg)   04/14/21 190 lb (86.2 kg)     Body mass index is 34.19 kg/m². CBC:   Lab Results   Component Value Date    WBC 12.1 03/24/2021    RBC 4.31 03/24/2021    HGB 13.3 03/24/2021    HCT 42.3 03/24/2021    MCV 98.1 03/24/2021    RDW 13.0 03/24/2021     03/24/2021       CMP:   Lab Results   Component Value Date     03/13/2019    K 4.6 03/13/2019     03/13/2019    CO2 24 03/13/2019    BUN 6 03/13/2019    CREATININE 0.6 03/13/2019    GFRAA >60 03/13/2019    LABGLOM >60 03/13/2019    GLUCOSE 81 03/13/2019    GLUCOSE 93 04/13/2011    PROT 6.7 03/13/2019    CALCIUM 8.7 03/13/2019    BILITOT 0.2 03/13/2019    ALKPHOS 84 03/13/2019    AST 15 03/13/2019    ALT 8 03/13/2019       POC Tests: No results for input(s): POCGLU, POCNA, POCK, POCCL, POCBUN, POCHEMO, POCHCT in the last 72 hours.     Coags: No results found for: PROTIME, INR, APTT    HCG (If Applicable):   Lab Results   Component Value Date    PREGTESTUR negative 06/28/2018    HCG <2.0 09/25/2013        ABGs: No results found for: PHART, PO2ART, ITC8IVH, XSF4WXP, BEART, T2MDGNSA     Type & Screen (If Applicable):  No results found for: LABABO, LABRH    Drug/Infectious Status (If Applicable):  No results found for: HIV, HEPCAB    COVID-19 Screening (If Applicable): No results found for: COVID19        Anesthesia Evaluation  Patient summary reviewed and Nursing notes reviewed no history of anesthetic complications:   Airway: Mallampati: IV  TM distance: >3 FB   Neck ROM: full  Mouth opening: > = 3 FB Dental: normal exam         Pulmonary:Negative Pulmonary ROS breath sounds clear to auscultation                             Cardiovascular:Negative CV ROS            Rhythm: regular  Rate: normal                    Neuro/Psych:   Negative Neuro/Psych ROS GI/Hepatic/Renal: Neg GI/Hepatic/Renal ROS            Endo/Other:                      ROS comment: PCOS  Bicornuate uterus  Rh- mother - received rhogam  Hx of post-operative hemorrhage and anemia with first two pregnancies. Abdominal:           Vascular: negative vascular ROS. Anesthesia Plan      general and spinal     ASA 2           MIPS: Prophylactic antiemetics administered. Anesthetic plan and risks discussed with patient and spouse. Use of blood products discussed with spouse whom consented to blood products. Plan discussed with attending.                   Luigi Diaz, CHRISTINA - CRNA   4/27/2021

## 2021-04-27 NOTE — PROGRESS NOTES
Patient got up with RN present, ambulated to the bathroom, voided a small amount of urine. Normal rubria lochia in toilet. No dizziness. Assisted back into bed.

## 2021-04-27 NOTE — LACTATION NOTE
Experienced breastfeeding mother. States baby is latching well for her. Encouraged to offer frequent feedings. Pt requests electric breast pump for home to increase her milk supply. Lactation contact & [employer] cristy info given.

## 2021-04-27 NOTE — PROGRESS NOTES
Patient delivered living male infant at 56 by repeat C/S. APGAR 9/9. No complications. Mother and baby both stable and bonding.

## 2021-04-27 NOTE — OP NOTE
Operative Note      Patient: Mark Hunter  YOB: 1990  MRN: [id number]    Date of Procedure: 2021    Pre-Op Diagnosis: repeat csection  Patient Active Problem List   Diagnosis    Group B Streptococcus carrier, +RV culture, currently pregnant    High risk multigravida, third trimester    Previous  delivery affecting pregnancy x1- Scheduled repeat C/S 21 at 0700    Declines  (vaginal birth after ) trial    History of macrosomia in infant in prior pregnancy, currently pregnant (10#1.9 oz at 38 weeks)- passed early 2hr GTT. Start EFW at 32 weeks.  Rh negative status during pregnancy in third trimester-rhogam done    Polycystic ovary affecting pregnancy, antepartum - on Metformin 500mg BID    H/O postpartum hemorrhage (in pregnacy #1 and #2, NOT #3 the C/S), currently pregnant    Bicornuate uterus affecting pregnancy, antepartum, third trimester    39 weeks gestation of pregnancy     Post-Op Diagnosis: Same, viable male infant delivered, mildly bicornuate versus arcuate uterus  Patient Active Problem List   Diagnosis    Group B Streptococcus carrier, +RV culture, currently pregnant    High risk multigravida, third trimester    Previous  delivery affecting pregnancy x1- Scheduled repeat C/S 21 at 0700    Declines  (vaginal birth after ) trial    History of macrosomia in infant in prior pregnancy, currently pregnant (10#1.9 oz at 38 weeks)- passed early 2hr GTT. Start EFW at 32 weeks.     Rh negative status during pregnancy in third trimester-rhogam done    Polycystic ovary affecting pregnancy, antepartum - previously on Metformin 500mg BID (stopped months prior to pregnancy)    H/O postpartum hemorrhage (in pregnacy #1 and #2, NOT #3 the C/S), currently pregnant    Bicornuate uterus (mildly arcuate vs a small septum at delivery) affecting pregnancy, antepartum, third trimester    39 weeks gestation of pregnancy     delivery, symptoms of UTI or PIH. There is good fetal movement. Findings:  Live Born 2021 at [facility] [place] [facility]  Sex: Male  Fetal Position:  Cephalic, occiput anterior  Apgars:  1 minute:  9; 5 minute:  9  Weight:  8# 9oz, 3880 grams  Nuchal Cord: was not present  Placenta: was delivered with gentle traction and was noted to be intact, whole and that the umbilical cord had three vessels noted. Tubes, uterus, ovaries:  Normal    Specimens:   * No specimens in log *    Implants:  * No implants in log *      Condition:    Infant stable, transfer to postanesthesia recovery then to [place] [address] Nursery  Mother stable, transfer to post anesthesia recovery then to Maternity    Detailed Description of Procedure: PROCEDURE IN DETAIL:  The patient was taken to the operating room, where patient identification was confirmed and a Time Out was performed. Duramorph spinal anesthesia was found to be adequate. She was prepped with a DuraPrep solution and draped in the usual sterile fashion in the dorsal supine position with leftward tilt. A Pfannenstiel skin incision was then made with a scalpel through the previous  section scar  and carried through to the underlying lay of fascia with the Bovie. The fascia was nicked in the midline and incision was extended laterally with the Ramirez scissors. Superior aspect of the fascial incision was grasped with Kocher clamps and elevated. The underlying rectus muscle was dissected using sharp and blunt dissection. Attention was then turned to the inferior aspect of this incision, which in a similar fashion was grasped with Kocher clamps and elevated. The underlying rectus muscle was dissected off using sharp and blunt dissection. The rectus muscles were then  in the midline, peritoneum identified, tented up, and entered with the Metzenbaum scissors. The peritoneal incision was extended superiorly and inferiorly with good visualization of the bladder.   The bladder blade was then

## 2021-04-27 NOTE — PROGRESS NOTES
Pt presents to unit  for repeat LTCS at 39.0 weeks. Patient has a history of post partum hemorrhage with 1st and 2nd delivery. +FM, denies lof, vb and ctx.

## 2021-04-27 NOTE — H&P
Department of Obstetrics and Gynecology  Labor and Delivery  History & Physical    Patient:  Varinder Cedillo     Admit Date:  2021  4:44 AM  Medical Record Number:  [id number]    CHIEF COMPLAINT:  IUP at 44 week, previous  section - declines     PROBLEM LIST:     Patient Active Problem List   Diagnosis    Group B Streptococcus carrier, +RV culture, currently pregnant    High risk multigravida, third trimester    Previous  delivery affecting pregnancy x1- Scheduled repeat C/S 21 at 0700    Declines  (vaginal birth after ) trial    History of macrosomia in infant in prior pregnancy, currently pregnant (10#1.9 oz at 38 weeks)- passed early 2hr GTT. Start EFW at 32 weeks.  Rh negative status during pregnancy in third trimester-rhogam done    Polycystic ovary affecting pregnancy, antepartum - previously on Metformin 500mg BID (stopped months prior to pregnancy)    H/O postpartum hemorrhage (in pregnacy #1 and #2, NOT #3 the C/S), currently pregnant    Bicornuate uterus  affecting pregnancy, antepartum, third trimester      HISTORY OF PRESENT ILLNESS:    The patient is a 35 y.o. W female [id number] at 39w0d. Patient presents for a repeat  section. Patient had 2 vaginal births, both complicated by postpartum hemorrhage. Patient's third child was delivered by  section due to macrosomia at the recommendation of [facility] without complications. Due to patient's fears over postpartum hemorrhage with the 2 vaginal births and the lack of complications after  delivery, the patient was elected to forego a  in favor of a repeat  delivery. Patient has occasional mild contraction. She denies leaking fluid, vaginal bleeding. Has no symptoms of UTI or PIH. There is good fetal movement.     ESTIMATED DUE DATE: Estimated Date of Delivery: 21    PRENATAL CARE:  Complicated by: History of postpartum hemorrhage with 2 vaginal births, previous  section for macrosomia, Rh-, PCOS prev on Metformin (stopped moths before conception), bicornuate uterus. GBS: positive    Past OB History  OB History        4    Para   3    Term   3            AB        Living   3       SAB        TAB        Ectopic        Molar        Multiple        Live Births   3                Past Medical History:        Diagnosis Date    Hemorrhoids, postpartum condition 2015    PCOS (polycystic ovarian syndrome)     Postoperative anemia due to acute blood loss 2019    Rh negative, maternal 2015       Past Surgical History:        Procedure Laterality Date    APPENDECTOMY       SECTION N/A 5/3/2019     SECTION performed by S[facility] at [facility]    DILATION AND CURETTAGE OF UTERUS  2017    ENDOMETRIAL BIOPSY  2017    POLYPECTOMY  2017    POLYPECTOMY  2017    D&C    TONSILLECTOMY         Allergies:  Patient has no known allergies.     Social History:    Social History     Socioeconomic History    Marital status:      Spouse name: Not on file    Number of children: Not on file    Years of education: Not on file    Highest education level: Not on file   Occupational History    Not on file   Social Needs    Financial resource strain: Not on file    Food insecurity     Worry: Not on file     Inability: Not on file    Transportation needs     Medical: Not on file     Non-medical: Not on file   Tobacco Use    Smoking status: Never Smoker    Smokeless tobacco: Never Used   Substance and Sexual Activity    Alcohol use: No    Drug use: No    Sexual activity: Yes     Partners: Male   Lifestyle    Physical activity     Days per week: Not on file     Minutes per session: Not on file    Stress: Not on file   Relationships    Social connections     Talks on phone: Not on file     Gets together: Not on file     Attends Scientologist service: Not on file     Active member of club or equal distal pulses, brisk capillary refill  Fetal heart rate:  Reassuring. Pelvis:  Adequate pelvis  Cervix: FT / 20% / soft / ballotable  Contraction frequency: Occasional, irregular mild  Membranes:  Intact    Labs:  Recent Results (from the past 72 hour(s))   URINE DRUG SCREEN    Collection Time: 21  5:00 AM   Result Value Ref Range    Amphetamine Screen, Urine NOT DETECTED Negative <1000 ng/mL    Barbiturate Screen, Ur NOT DETECTED Negative < 200 ng/mL    Benzodiazepine Screen, Urine NOT DETECTED Negative < 200 ng/mL    Cannabinoid Scrn, Ur NOT DETECTED Negative < 50ng/mL    Cocaine Metabolite Screen, Urine NOT DETECTED Negative < 300 ng/mL    Opiate Scrn, Ur NOT DETECTED Negative < 300ng/mL    PCP Screen, Urine NOT DETECTED Negative < 25 ng/mL    Methadone Screen, Urine NOT DETECTED Negative <300 ng/mL    Oxycodone Urine NOT DETECTED Negative <100 ng/mL    FENTANYL SCREEN, URINE NOT DETECTED Negative <1 ng/mL    Drug Screen Comment: see below    TYPE AND SCREEN    Collection Time: 21  5:20 AM   Result Value Ref Range    ABO/Rh CANCELED     Antibody Screen POS    CBC    Collection Time: 21  5:20 AM   Result Value Ref Range    WBC 11.0 4.5 - 11.5 E9/L    RBC 4.47 3.50 - 5.50 E12/L    Hemoglobin 14.0 11.5 - 15.5 g/dL    Hematocrit 42.0 34.0 - 48.0 %    MCV 94.0 80.0 - 99.9 fL    MCH 31.3 26.0 - 35.0 pg    MCHC 33.3 32.0 - 34.5 %    RDW 12.7 11.5 - 15.0 fL    Platelets 852 390 - 294 E9/L    MPV 10.1 7.0 - 12.0 fL   ABORH TUBE    Collection Time: 21  5:20 AM   Result Value Ref Range    ABO/Rh A NEG    DIRECT ANTIGLOBULIN TEST    Collection Time: 21  5:20 AM   Result Value Ref Range    Polyspecific Tigre NEG        ASSESSMENT:  35 y.o. W female at 39w0d [id number]  Previous  section for macrosomia- declines   History of postpartum hemorrhage with 2 prior vaginal deliveries  GBS positive.   History of macrosomia and prior pregnancy   PCOS in pregnancy, on no meds since prior to conception  Rh-  History of bicornate uterus  Patient Active Problem List   Diagnosis    Group B Streptococcus carrier, +RV culture, currently pregnant    High risk multigravida, third trimester    Previous  delivery affecting pregnancy x1- Scheduled repeat C/S 21 at 0700    Declines  (vaginal birth after ) trial    History of macrosomia in infant in prior pregnancy, currently pregnant (10#1.9 oz at 38 weeks)- passed early 2hr GTT. Start EFW at 32 weeks.     Rh negative status during pregnancy in third trimester-rhogam done    Polycystic ovary affecting pregnancy, antepartum - previously on Metformin 500mg BID (stopped months prior to pregnancy)    H/O postpartum hemorrhage (in pregnacy #1 and #2, NOT #3 the C/S), currently pregnant    Bicornuate uterus affecting pregnancy, antepartum, third trimester     Fetus: Reassuring  GBS: positive    PLAN:  Orders Placed This Encounter   Procedures    CBC    URINE DRUG SCREEN    Diet NPO Effective Now    Vital signs per unit routine    Notify physician for abnormal lab results, non-reassuring fetal status    Up as tolerated    Monitor and record Fetal Heart rate until prepped if laboring    Insert Tate catheter    Clip abdominal/pubic hair at operative site    Abdominal prep    Monitor fetal heart rate (external)    External uterine contraction monitoring    Place intermittent pneumatic compression device when not ambulatory    Full Code    Nonrebreather mask oxygen    TYPE AND SCREEN    ABORH TUBE    Antibody Identification    Direct Antiglobulin Test    Insert peripheral IV    PATIENT STATUS (DIRECT) Inpatient     Current Facility-Administered Medications   Medication Dose Route Frequency Provider Last Rate Last Admin    lactated ringers infusion   Intravenous Continuous Bertha Melendez MD        lactated ringers bolus  1,000 mL Intravenous Once Bertha Melendez MD        ceFAZolin (ANCEF) 2000 mg in sterile water 20 mL IV syringe  2,000 mg Intravenous Once Pili Patricia MD        citric acid-sodium citrate (BICITRA) solution  30 PO Once Piil Patricia MD           Management options were reviewed, the risks of surgery were discussed, including, but not limited to that of anesthesia, infection, hemorrhage, transfusion, blood borne disease, bowel/bladder/ureteral/vascular injury, and any corrective surgeries (bladder, ureter, bowel, hysterectomy), uterine or cervical lacerations, injury to the baby, DVT,PE, pain and death. Questions were answered to both the patient's and FOB/family satisfaction and informed consent was obtained to proceed as planned. Pili Patricia M.D.  [facility]  4/27/2021 5:14 AM

## 2021-04-28 LAB
BASOPHILS ABSOLUTE: 0.03 E9/L (ref 0–0.2)
BASOPHILS RELATIVE PERCENT: 0.3 % (ref 0–2)
EOSINOPHILS ABSOLUTE: 0.1 E9/L (ref 0.05–0.5)
EOSINOPHILS RELATIVE PERCENT: 0.8 % (ref 0–6)
FETAL SCREEN: NORMAL
HCT VFR BLD CALC: 38.5 % (ref 34–48)
HEMOGLOBIN: 12.4 G/DL (ref 11.5–15.5)
IMMATURE GRANULOCYTES #: 0.07 E9/L
IMMATURE GRANULOCYTES %: 0.6 % (ref 0–5)
LYMPHOCYTES ABSOLUTE: 3.21 E9/L (ref 1.5–4)
LYMPHOCYTES RELATIVE PERCENT: 26.8 % (ref 20–42)
MCH RBC QN AUTO: 30.8 PG (ref 26–35)
MCHC RBC AUTO-ENTMCNC: 32.2 % (ref 32–34.5)
MCV RBC AUTO: 95.5 FL (ref 80–99.9)
MONOCYTES ABSOLUTE: 1.21 E9/L (ref 0.1–0.95)
MONOCYTES RELATIVE PERCENT: 10.1 % (ref 2–12)
NEUTROPHILS ABSOLUTE: 7.38 E9/L (ref 1.8–7.3)
NEUTROPHILS RELATIVE PERCENT: 61.4 % (ref 43–80)
PDW BLD-RTO: 12.9 FL (ref 11.5–15)
PLATELET # BLD: 189 E9/L (ref 130–450)
PMV BLD AUTO: 10.1 FL (ref 7–12)
RBC # BLD: 4.03 E12/L (ref 3.5–5.5)
RHIG LOT NUMBER: NORMAL
WBC # BLD: 12 E9/L (ref 4.5–11.5)

## 2021-04-28 PROCEDURE — 85025 COMPLETE CBC W/AUTO DIFF WBC: CPT

## 2021-04-28 PROCEDURE — 85461 HEMOGLOBIN FETAL: CPT

## 2021-04-28 PROCEDURE — 6360000002 HC RX W HCPCS: Performed by: OBSTETRICS & GYNECOLOGY

## 2021-04-28 PROCEDURE — 36415 COLL VENOUS BLD VENIPUNCTURE: CPT

## 2021-04-28 PROCEDURE — 1220000000 HC SEMI PRIVATE OB R&B

## 2021-04-28 PROCEDURE — 6370000000 HC RX 637 (ALT 250 FOR IP): Performed by: OBSTETRICS & GYNECOLOGY

## 2021-04-28 RX ADMIN — METFORMIN HYDROCHLORIDE 1 TABLET: 500 TABLET, EXTENDED RELEASE ORAL at 10:46

## 2021-04-28 RX ADMIN — SIMETHICONE 80 MG: 80 TABLET, CHEWABLE ORAL at 10:46

## 2021-04-28 RX ADMIN — KETOROLAC TROMETHAMINE 30 MG: 30 INJECTION, SOLUTION INTRAMUSCULAR; INTRAVENOUS at 06:17

## 2021-04-28 RX ADMIN — IBUPROFEN 600 MG: 600 TABLET ORAL at 11:51

## 2021-04-28 RX ADMIN — DOCUSATE SODIUM 100 MG: 100 CAPSULE, LIQUID FILLED ORAL at 22:50

## 2021-04-28 RX ADMIN — HUMAN RHO(D) IMMUNE GLOBULIN 300 MCG: 300 INJECTION, SOLUTION INTRAMUSCULAR at 10:59

## 2021-04-28 RX ADMIN — Medication: at 10:47

## 2021-04-28 RX ADMIN — ACETAMINOPHEN 650 MG: 325 TABLET ORAL at 16:26

## 2021-04-28 RX ADMIN — DOCUSATE SODIUM 100 MG: 100 CAPSULE, LIQUID FILLED ORAL at 10:47

## 2021-04-28 RX ADMIN — IBUPROFEN 600 MG: 600 TABLET ORAL at 18:33

## 2021-04-28 RX ADMIN — SIMETHICONE 80 MG: 80 TABLET, CHEWABLE ORAL at 18:32

## 2021-04-28 ASSESSMENT — PAIN - FUNCTIONAL ASSESSMENT: PAIN_FUNCTIONAL_ASSESSMENT: PREVENTS OR INTERFERES SOME ACTIVE ACTIVITIES AND ADLS

## 2021-04-28 ASSESSMENT — PAIN DESCRIPTION - FREQUENCY: FREQUENCY: INTERMITTENT

## 2021-04-28 ASSESSMENT — PAIN SCALES - GENERAL
PAINLEVEL_OUTOF10: 3
PAINLEVEL_OUTOF10: 3
PAINLEVEL_OUTOF10: 2

## 2021-04-28 ASSESSMENT — PAIN DESCRIPTION - PAIN TYPE: TYPE: ACUTE PAIN;SURGICAL PAIN

## 2021-04-28 ASSESSMENT — PAIN DESCRIPTION - ONSET: ONSET: GRADUAL

## 2021-04-28 NOTE — PROGRESS NOTES
Screen Comment: see below    TYPE AND SCREEN    Collection Time: 21  5:20 AM   Result Value Ref Range    ABO/Rh CANCELED     Antibody Screen POS    CBC    Collection Time: 21  5:20 AM   Result Value Ref Range    WBC 11.0 4.5 - 11.5 E9/L    RBC 4.47 3.50 - 5.50 E12/L    Hemoglobin 14.0 11.5 - 15.5 g/dL    Hematocrit 42.0 34.0 - 48.0 %    MCV 94.0 80.0 - 99.9 fL    MCH 31.3 26.0 - 35.0 pg    MCHC 33.3 32.0 - 34.5 %    RDW 12.7 11.5 - 15.0 fL    Platelets 155 407 - 975 E9/L    MPV 10.1 7.0 - 12.0 fL   ABORH TUBE    Collection Time: 21  5:20 AM   Result Value Ref Range    ABO/Rh A NEG    ANTIBODY IDENTIFICATION    Collection Time: 21  5:20 AM   Result Value Ref Range    Antibody ID POS, PassiveAnti-D,Patient Rec'd RHIG    DIRECT ANTIGLOBULIN TEST    Collection Time: 21  5:20 AM   Result Value Ref Range    Polyspecific Tigre NEG    CBC auto differential    Collection Time: 21  5:11 AM   Result Value Ref Range    WBC 12.0 (H) 4.5 - 11.5 E9/L    RBC 4.03 3.50 - 5.50 E12/L    Hemoglobin 12.4 11.5 - 15.5 g/dL    Hematocrit 38.5 34.0 - 48.0 %    MCV 95.5 80.0 - 99.9 fL    MCH 30.8 26.0 - 35.0 pg    MCHC 32.2 32.0 - 34.5 %    RDW 12.9 11.5 - 15.0 fL    Platelets 772 738 - 044 E9/L    MPV 10.1 7.0 - 12.0 fL    Neutrophils % 61.4 43.0 - 80.0 %    Immature Granulocytes % 0.6 0.0 - 5.0 %    Lymphocytes % 26.8 20.0 - 42.0 %    Monocytes % 10.1 2.0 - 12.0 %    Eosinophils % 0.8 0.0 - 6.0 %    Basophils % 0.3 0.0 - 2.0 %    Neutrophils Absolute 7.38 (H) 1.80 - 7.30 E9/L    Immature Granulocytes # 0.07 E9/L    Lymphocytes Absolute 3.21 1.50 - 4.00 E9/L    Monocytes Absolute 1.21 (H) 0.10 - 0.95 E9/L    Eosinophils Absolute 0.10 0.05 - 0.50 E9/L    Basophils Absolute 0.03 0.00 - 0.20 E9/L   FETAL SCREEN    Collection Time: 21  5:11 AM   Result Value Ref Range    Fetal Screen NEG        A: 35 y.o. female [id number] at 39w0d  POD#1 S/P repeat low transverse  section  Previous  section for macrosomia- declines   History of postpartum hemorrhage with 2 prior vaginal deliveries  GBS positive. History of macrosomia and prior pregnancy   PCOS in pregnancy, on no meds since prior to conception  Rh-  History of bicornate uterus (mildly arcuate vs small septum at on exam at  section)  Patient Active Problem List   Diagnosis    Group B Streptococcus carrier, +RV culture, currently pregnant    High risk multigravida, third trimester    Previous  delivery affecting pregnancy x1- Scheduled repeat C/S 21 at 0700    Declines  (vaginal birth after ) trial    History of macrosomia in infant in prior pregnancy, currently pregnant (10#1.9 oz at 38 weeks)- passed early 2hr GTT. Start EFW at 32 weeks.  Rh negative status during pregnancy in third trimester-rhogam done    Polycystic ovary affecting pregnancy, antepartum - previously on Metformin 500mg BID (stopped months prior to pregnancy)    H/O postpartum hemorrhage (in pregnacy #1 and #2, NOT #3 the C/S), currently pregnant    Bicornuate uterus (mildly arcuate vs a small septum at delivery) affecting pregnancy, antepartum, third trimester    39 weeks gestation of pregnancy     delivery, delivered, current hospitalization    Term birth of  male       P: Routine post-op care. D/C IV, IVF's, IV meds and Tate -in progress. Advance diet and activity as tolerated -in progress. Resume prenatal vitamins with iron daily. Initiate PO pain meds -ibuprofen and Percocet.     Yohana Amaya MD FACOG  2021 9:12 AM

## 2021-04-28 NOTE — ANESTHESIA POSTPROCEDURE EVALUATION
Department of Anesthesiology  Postprocedure Note    Patient: Aaron Patel  MRN: [id number]  [place]: 1990  Date of evaluation: 2021  Time:  8:36 AM     Procedure Summary     Date: 21 Room / Location: [facility] OR 01 / [facility]    Anesthesia Start: 701 Anesthesia Stop: 9725    Procedure:  SECTION (N/A Uterus) Diagnosis: (repeat csection)    Surgeons: Sp Mark MD Responsible Provider: Vamsi Hess MD    Anesthesia Type: general, spinal ASA Status: 2          Anesthesia Type: general, spinal    Robert Phase I: Robert Score: 10    Robert Phase II:      Last vitals: Reviewed and per EMR flowsheets. Anesthesia Post Evaluation    Patient location during evaluation: bedside (1132)  Patient participation: complete - patient participated  Level of consciousness: awake and alert  Pain score: 0  Airway patency: patent  Nausea & Vomiting: no nausea and no vomiting  Complications: no  Cardiovascular status: hemodynamically stable  Respiratory status: acceptable and room air  Hydration status: euvolemic  Comments: Pt ambulatory, denies any numbness or tingling in legs. Able to tolerate solids and liquids.

## 2021-04-29 VITALS
HEART RATE: 78 BPM | WEIGHT: 193 LBS | TEMPERATURE: 98.4 F | RESPIRATION RATE: 18 BRPM | DIASTOLIC BLOOD PRESSURE: 69 MMHG | OXYGEN SATURATION: 100 % | HEIGHT: 63 IN | SYSTOLIC BLOOD PRESSURE: 119 MMHG | BODY MASS INDEX: 34.2 KG/M2

## 2021-04-29 PROBLEM — E28.2 POLYCYSTIC OVARY AFFECTING PREGNANCY, ANTEPARTUM: Status: RESOLVED | Noted: 2021-04-21 | Resolved: 2021-04-29

## 2021-04-29 PROBLEM — Z3A.39 39 WEEKS GESTATION OF PREGNANCY: Status: RESOLVED | Noted: 2021-04-27 | Resolved: 2021-04-29

## 2021-04-29 PROBLEM — O09.299 HISTORY OF MACROSOMIA IN INFANT IN PRIOR PREGNANCY, CURRENTLY PREGNANT: Status: RESOLVED | Noted: 2021-04-14 | Resolved: 2021-04-29

## 2021-04-29 PROBLEM — Z67.91 RH NEGATIVE STATUS DURING PREGNANCY IN FIRST TRIMESTER: Status: RESOLVED | Noted: 2021-04-21 | Resolved: 2021-04-29

## 2021-04-29 PROBLEM — O09.42 HIGH RISK MULTIGRAVIDA, SECOND TRIMESTER: Status: RESOLVED | Noted: 2021-04-14 | Resolved: 2021-04-29

## 2021-04-29 PROBLEM — O34.219 PREVIOUS CESAREAN DELIVERY AFFECTING PREGNANCY: Status: RESOLVED | Noted: 2021-04-14 | Resolved: 2021-04-29

## 2021-04-29 PROBLEM — O34.03 BICORNUATE UTERUS AFFECTING PREGNANCY, ANTEPARTUM, THIRD TRIMESTER: Status: RESOLVED | Noted: 2021-04-21 | Resolved: 2021-04-29

## 2021-04-29 PROBLEM — O09.299 H/O POSTPARTUM HEMORRHAGE, CURRENTLY PREGNANT: Status: RESOLVED | Noted: 2021-04-21 | Resolved: 2021-04-29

## 2021-04-29 PROBLEM — Q51.3 BICORNUATE UTERUS AFFECTING PREGNANCY, ANTEPARTUM, THIRD TRIMESTER: Status: RESOLVED | Noted: 2021-04-21 | Resolved: 2021-04-29

## 2021-04-29 PROBLEM — O26.891 RH NEGATIVE STATUS DURING PREGNANCY IN FIRST TRIMESTER: Status: RESOLVED | Noted: 2021-04-21 | Resolved: 2021-04-29

## 2021-04-29 PROBLEM — O34.80 POLYCYSTIC OVARY AFFECTING PREGNANCY, ANTEPARTUM: Status: RESOLVED | Noted: 2021-04-21 | Resolved: 2021-04-29

## 2021-04-29 PROBLEM — O34.219 DECLINES VBAC (VAGINAL BIRTH AFTER CESAREAN) TRIAL: Status: RESOLVED | Noted: 2021-04-14 | Resolved: 2021-04-29

## 2021-04-29 PROCEDURE — 6370000000 HC RX 637 (ALT 250 FOR IP): Performed by: OBSTETRICS & GYNECOLOGY

## 2021-04-29 RX ORDER — OXYCODONE HYDROCHLORIDE AND ACETAMINOPHEN 5; 325 MG/1; MG/1
1 TABLET ORAL EVERY 6 HOURS PRN
Qty: 20 TABLET | Refills: 0 | Status: SHIPPED | OUTPATIENT
Start: 2021-04-29 | End: 2021-05-04

## 2021-04-29 RX ORDER — PSEUDOEPHEDRINE HCL 30 MG
100 TABLET ORAL 2 TIMES DAILY PRN
COMMUNITY
Start: 2021-04-29 | End: 2021-06-09

## 2021-04-29 RX ORDER — IBUPROFEN 600 MG/1
600 TABLET ORAL EVERY 6 HOURS PRN
Qty: 30 TABLET | Refills: 1 | Status: SHIPPED | OUTPATIENT
Start: 2021-04-29 | End: 2021-06-09

## 2021-04-29 RX ORDER — MODIFIED LANOLIN
1 OINTMENT (GRAM) TOPICAL
Refills: 0 | COMMUNITY
Start: 2021-04-29 | End: 2021-06-09

## 2021-04-29 RX ADMIN — DOCUSATE SODIUM 100 MG: 100 CAPSULE, LIQUID FILLED ORAL at 08:07

## 2021-04-29 RX ADMIN — OXYCODONE HYDROCHLORIDE AND ACETAMINOPHEN 1 TABLET: 5; 325 TABLET ORAL at 08:06

## 2021-04-29 RX ADMIN — SIMETHICONE 80 MG: 80 TABLET, CHEWABLE ORAL at 08:07

## 2021-04-29 RX ADMIN — IBUPROFEN 600 MG: 600 TABLET ORAL at 09:39

## 2021-04-29 RX ADMIN — METFORMIN HYDROCHLORIDE 1 TABLET: 500 TABLET, EXTENDED RELEASE ORAL at 08:07

## 2021-04-29 RX ADMIN — IBUPROFEN 600 MG: 600 TABLET ORAL at 00:46

## 2021-04-29 ASSESSMENT — PAIN SCALES - GENERAL: PAINLEVEL_OUTOF10: 2

## 2021-04-29 NOTE — LACTATION NOTE
Mom breast and formula feeding per choice, feels like milk is coming in. Encouraged frequent feeds to establish milk supply. Reviewed benefits and safety of skin to skin. Inst on adequate I/O and importance of keeping track of diapers at home. Instructed on signs of dehydration such as infant refusing to feed, decreased wet diapers and infant becoming listless and notify provider if these occur. Reviewed with mom the importance of notifying the physician if baby looks more jaundiced. Lactation office # given if follow-up needed, as well as other helpful resources. Encouraged to call with any concerns. Support and encouragement given.

## 2021-04-29 NOTE — PROGRESS NOTES
<300 ng/mL    Oxycodone Urine NOT DETECTED Negative <100 ng/mL    FENTANYL SCREEN, URINE NOT DETECTED Negative <1 ng/mL    Drug Screen Comment: see below    TYPE AND SCREEN    Collection Time: 04/27/21  5:20 AM   Result Value Ref Range    ABO/Rh CANCELED     Antibody Screen POS    CBC    Collection Time: 04/27/21  5:20 AM   Result Value Ref Range    WBC 11.0 4.5 - 11.5 E9/L    RBC 4.47 3.50 - 5.50 E12/L    Hemoglobin 14.0 11.5 - 15.5 g/dL    Hematocrit 42.0 34.0 - 48.0 %    MCV 94.0 80.0 - 99.9 fL    MCH 31.3 26.0 - 35.0 pg    MCHC 33.3 32.0 - 34.5 %    RDW 12.7 11.5 - 15.0 fL    Platelets 496 865 - 878 E9/L    MPV 10.1 7.0 - 12.0 fL   ABORH TUBE    Collection Time: 04/27/21  5:20 AM   Result Value Ref Range    ABO/Rh A NEG    ANTIBODY IDENTIFICATION    Collection Time: 04/27/21  5:20 AM   Result Value Ref Range    Antibody ID POS, PassiveAnti-D,Patient Rec'd RHIG    DIRECT ANTIGLOBULIN TEST    Collection Time: 04/27/21  5:20 AM   Result Value Ref Range    Polyspecific Tigre NEG    CBC auto differential    Collection Time: 04/28/21  5:11 AM   Result Value Ref Range    WBC 12.0 (H) 4.5 - 11.5 E9/L    RBC 4.03 3.50 - 5.50 E12/L    Hemoglobin 12.4 11.5 - 15.5 g/dL    Hematocrit 38.5 34.0 - 48.0 %    MCV 95.5 80.0 - 99.9 fL    MCH 30.8 26.0 - 35.0 pg    MCHC 32.2 32.0 - 34.5 %    RDW 12.9 11.5 - 15.0 fL    Platelets 249 500 - 655 E9/L    MPV 10.1 7.0 - 12.0 fL    Neutrophils % 61.4 43.0 - 80.0 %    Immature Granulocytes % 0.6 0.0 - 5.0 %    Lymphocytes % 26.8 20.0 - 42.0 %    Monocytes % 10.1 2.0 - 12.0 %    Eosinophils % 0.8 0.0 - 6.0 %    Basophils % 0.3 0.0 - 2.0 %    Neutrophils Absolute 7.38 (H) 1.80 - 7.30 E9/L    Immature Granulocytes # 0.07 E9/L    Lymphocytes Absolute 3.21 1.50 - 4.00 E9/L    Monocytes Absolute 1.21 (H) 0.10 - 0.95 E9/L    Eosinophils Absolute 0.10 0.05 - 0.50 E9/L    Basophils Absolute 0.03 0.00 - 0.20 E9/L   FETAL SCREEN    Collection Time: 04/28/21  5:11 AM   Result Value Ref Range    Fetal Screen NEG    RH IMMUNE GLOBULIN    Collection Time: 21  5:11 AM   Result Value Ref Range    RHIG LOT NUMBER       RhImmuneGlobulin    [id number]          issued       1 vial  21 10:45       A: 35 y.o. female  at 39w0d  POD#2 S/P repeat low transverse  section  Patient Active Problem List   Diagnosis    Group B Streptococcus carrier, +RV culture, currently pregnant    High risk multigravida, third trimester    Previous  delivery affecting pregnancy x1- Scheduled repeat C/S 21 at 0700    Declines  (vaginal birth after ) trial    History of macrosomia in infant in prior pregnancy, currently pregnant (10#1.9 oz at 38 weeks)- passed early 2hr GTT. Start EFW at 32 weeks.  Rh negative status during pregnancy in third trimester-rhogam done    Polycystic ovary affecting pregnancy, antepartum - previously on Metformin 500mg BID (stopped months prior to pregnancy)    H/O postpartum hemorrhage (in pregnacy #1 and #2, NOT #3 the C/S), currently pregnant    Bicornuate uterus (mildly arcuate vs a small septum at delivery) affecting pregnancy, antepartum, third trimester    39 weeks gestation of pregnancy     delivery, delivered, current hospitalization    Term birth of  male       P: Routine post-op care. Discharge home with instructions, precautions. Prescriptions for Percocet and ibuprofen 600. May continue over-the-counter Simethicone and Colace PRN. Continue PNV with Iron  Follow-up office 1 week for incision check, and 6-8 weeks for final post-op visit.     Hayley Palacios MD FACOG  2021 9:01 AM

## 2021-04-29 NOTE — DISCHARGE SUMMARY
Department of Obstetrics and Gynecology  Labor and Delivery  Discharge Summary    Patient:  Casper Pollack         Medical Record Number:  [id number]    Admit Date:  2021  4:44 AM    Discharge Date: 2021    Final Diagnosis:   Principal Problem (Resolved):    39 weeks gestation of pregnancy  Active Problems:    * No active hospital problems. 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 Start EFW at 32 weeks. Rh negative status during pregnancy in third trimester-rhogam done    Polycystic ovary affecting pregnancy, antepartum - previously on Metformin 500mg BID (stopped months prior to pregnancy)    H/O postpartum hemorrhage (in pregnacy #1 and #2, NOT #3 the C/S), currently pregnant    Chief Complaint - Presenting Illness - Physical Findings:   44 weeks gestation of pregnancy [Z3A.39]  HPI: The patient is a 32 y.o. W female [id number] at 39w0d. Sruthi Mcneil presents for a repeat  section. Sruthi Mcneil had 2 vaginal births, both complicated by postpartum hemorrhage.  Patient's third child was delivered by  section due to macrosomia at the recommendation of [facility] without complications.  Management options were reviewed, the risks of surgery were discussed, including, but not limited to that of anesthesia, infection, hemorrhage, transfusion, blood borne disease, bowel/bladder/ureteral/vascular injury, and any corrective surgeries (bladder, ureter, bowel, hysterectomy), uterine or cervical lacerations, injury to the baby, DVT,PE, pain and death.  Questions were answered to both the patient's and FOB/family satisfaction and due to patient's fears over postpartum hemorrhage with the 2 vaginal births and the lack of complications after the  delivery, the patient was elected to forego a  in favor of a repeat  delivery.  Patient has occasional mild contraction.  She denies leaking fluid, vaginal bleeding.  Has no symptoms of UTI or PIH.  There is good fetal movement. Hospital Course:   Delivery Summary:  Date of Procedure: 2021     Pre-Op Diagnosis: repeat csection      Patient Active Problem List   Diagnosis    Group B Streptococcus carrier, +RV culture, currently pregnant    High risk multigravida, third trimester    Previous  delivery affecting pregnancy x1- Scheduled repeat C/S 21 at 0700    Declines  (vaginal birth after ) trial    History of macrosomia in infant in prior pregnancy, currently pregnant (10#1.9 oz at 38 weeks)- passed early 2hr GTT. Start EFW at 32 weeks.     Rh negative status during pregnancy in third trimester-rhogam done    Polycystic ovary affecting pregnancy, antepartum - on Metformin 500mg BID    H/O postpartum hemorrhage (in pregnacy #1 and #2, NOT #3 the C/S), currently pregnant    Bicornuate uterus affecting pregnancy, antepartum, third trimester    39 weeks gestation of pregnancy      Post-Op Diagnosis: Same, viable male infant delivered, mildly bicornuate versus arcuate uterus  Patient Active Problem List   Diagnosis    Group B Streptococcus carrier, +RV culture, currently pregnant    High risk multigravida, third trimester    Previous  delivery affecting pregnancy x1- Scheduled repeat C/S 21 at 0700    Declines  (vaginal birth after ) trial    History of macrosomia in infant in prior pregnancy, currently pregnant (10#1.9 oz at 38 weeks)- passed early 2hr GTT. Start EFW at 32 weeks.  Rh negative status during pregnancy in third trimester-rhogam done    Polycystic ovary affecting pregnancy, antepartum - previously on Metformin 500mg BID (stopped months prior to pregnancy)    H/O postpartum hemorrhage (in pregnacy #1 and #2, NOT #3 the C/S), currently pregnant    Bicornuate uterus (mildly arcuate vs a small septum at delivery) affecting pregnancy, antepartum, third trimester    39 weeks gestation of pregnancy     delivery, delivered, current hospitalization    Term birth of  male      Procedure(s): Repeat Low Transverse  Section Via Pfannenstiel Skin Incision     Surgeon(s): Marlon Mohs, MD     Assistant: Talha Benton MD     Anesthesia: Spinal, with Duramorph     Complications: None     Estimated Blood Loss (mL): 500     Fluids Replaced (mL): 1300 crystalloid     Urine Output (mL): 275, clear     Drains:        Urethral Catheter Non-latex 16 fr (Active)   $ Urethral catheter insertion Inserted for procedure 2115   Catheter Indications Perioperative use for selected surgical procedures 21   Securement Device Date Changed 21 0815   Site Assessment Moist;Pink 2115   Urine Color Yellow 2115   Urine Appearance Clear 21 0815      Intraoperative Medications: Ancef 2 gms at induction, IV pitocin drip post placenta     Findings:  Live Born 2021 at [facility] [address]  Sex: Male  Fetal Position:  Cephalic, occiput anterior  Apgars:  1 minute:  9; 5 minute:  9  Weight:  8# 9oz, 3880 grams  Nuchal Cord: was not present  Placenta: was delivered with gentle traction and was noted to be intact, whole and that the umbilical cord had three vessels noted.   Tubes, uterus, ovaries:  Normal     Specimens:   * No specimens in log *     Implants:  * No implants in log *      Condition:    Infant stable, transfer to postanesthesia recovery then to [place] [id number] Nursery  Mother stable, transfer to post anesthesia recovery then to [facility]     The patient was transferred to the recovery room with her IV, Atte, and SCD's from OR in place, where she was given IV Toradol to supplement her Duramorph Spinal for pain management. Her IV antibiotics were continued for 24 hour coverage. On the first postoperative day her IV, IVF, IV medications, SCD's and Tate were discontinued. She was started on PO pain meds (Motrin 600 mg with Percocet to be used as needed for breakthrough pain). She was encouraged to advance her diet and activities as tolerated. The patient had an unremarkable Hospital Course and requested an early discharge on day #2. Her care was advanced per routine protocol. Her vital signs were stable, she remained afebrile, and her physical exam was unremarkable throughout her hospitalization. She was subsequently discharged home on the second Hospital Day as she was tolerating her diet, ambulating well, voiding without difficulty and had appropriate flatus with a bowel movement.     Recent Results (from the past 72 hour(s))   URINE DRUG SCREEN    Collection Time: 04/27/21  5:00 AM   Result Value Ref Range    Amphetamine Screen, Urine NOT DETECTED Negative <1000 ng/mL    Barbiturate Screen, Ur NOT DETECTED Negative < 200 ng/mL    Benzodiazepine Screen, Urine NOT DETECTED Negative < 200 ng/mL    Cannabinoid Scrn, Ur NOT DETECTED Negative < 50ng/mL    Cocaine Metabolite Screen, Urine NOT DETECTED Negative < 300 ng/mL    Opiate Scrn, Ur NOT DETECTED Negative < 300ng/mL    PCP Screen, Urine NOT DETECTED Negative < 25 ng/mL    Methadone Screen, Urine NOT DETECTED Negative <300 ng/mL    Oxycodone Urine NOT DETECTED Negative <100 ng/mL    FENTANYL SCREEN, URINE NOT DETECTED Negative <1 ng/mL    Drug Screen Comment: see below    TYPE AND SCREEN    Collection Time: 04/27/21  5:20 AM   Result Value Ref Range    ABO/Rh CANCELED     Antibody Screen POS    CBC    Collection Time: 04/27/21  5:20 AM   Result Value Ref Range    WBC 11.0 4.5 - 11.5 E9/L    RBC 4.47 3.50 - 5.50 E12/L    Hemoglobin 14.0 11.5 - 15.5 g/dL    Hematocrit 42.0 34.0 - 48.0 %    MCV 94.0 80.0 - 99.9 fL    MCH 31.3 26.0 - 35.0 pg    MCHC 33.3 32.0 - 34.5 %    RDW 12.7 11.5 - 15.0 fL    Platelets 270 867 - 033 E9/L    MPV 10.1 7.0 - 12.0 fL   ABORH TUBE    Collection Time: 04/27/21  5:20 AM   Result Value Ref Range    ABO/Rh A NEG    ANTIBODY IDENTIFICATION    Collection Time: 04/27/21  5:20 AM   Result Value Ref Range    Antibody ID POS, PassiveAnti-D,Patient Rec'd RHIG    DIRECT ANTIGLOBULIN TEST    Collection Time: 04/27/21  5:20 AM   Result Value Ref Range    Polyspecific [place] NEG    CBC auto differential    Collection Time: 04/28/21  5:11 AM   Result Value Ref Range    WBC 12.0 (H) 4.5 - 11.5 E9/L    RBC 4.03 3.50 - 5.50 E12/L    Hemoglobin 12.4 11.5 - 15.5 g/dL    Hematocrit 38.5 34.0 - 48.0 %    MCV 95.5 80.0 - 99.9 fL    MCH 30.8 26.0 - 35.0 pg    MCHC 32.2 32.0 - 34.5 %    RDW 12.9 11.5 - 15.0 fL    Platelets 253 456 - 920 E9/L    MPV 10.1 7.0 - 12.0 fL    Neutrophils % 61.4 43.0 - 80.0 %    Immature Granulocytes % 0.6 0.0 - 5.0 %    Lymphocytes % 26.8 20.0 - 42.0 %    Monocytes % 10.1 2.0 - 12.0 %    Eosinophils % 0.8 0.0 - 6.0 %    Basophils % 0.3 0.0 - 2.0 %    Neutrophils Absolute 7.38 (H) 1.80 - 7.30 E9/L    Immature Granulocytes # 0.07 E9/L    Lymphocytes Absolute 3.21 1.50 - 4.00 E9/L    Monocytes Absolute 1.21 (H) 0.10 - 0.95 E9/L    Eosinophils Absolute 0.10 0.05 - 0.50 E9/L    Basophils Absolute 0.03 0.00 - 0.20 E9/L   FETAL SCREEN    Collection Time: 04/28/21  5:11 AM   Result Value Ref Range    Fetal Screen NEG    RH IMMUNE GLOBULIN    Collection Time: 04/28/21  5:11 AM   Result Value Ref Range    RHIG LOT NUMBER       RhImmuneGlobulin    [id number]          issued       1 vial  04/28/21 10:45     Physicians Following Patient During Hospitalization - Reason For Care:  Admitting Physician: Larry Grissom Physician: Isaac Saenz Physician(s):    POD#1 [place]   POD#2 [address]  Discharging Physician: [address]  Consultant(s): n/a    Discharge Condition:  · Level of Function:  Stable  · Caregiver Arrangements and Educational Efforts: Written Discharge Instructions were verbally reviewed and given to the Patient. · Special Problems: None    Plans For Continuing Care:  · Unreported Test Results and Intended Follow-Up: 1 week(s) time. · Instructions for Physical Activity: No driving for 2 week(s). No sex or tampon use for 6 weeks. No douching. No heavy lifting (greater than baby and carrier) for 6 weeks. Frequent ambulation with stairs - start slowly then increase as tolerated. Return to work in 10 -8 weeks. Showers are fine - avoid bath tubs, hot tubs, swimming. · Instructions for Diet: Regular diet  · Discharge Medications:  Current Discharge Medication List      START taking these medications    Details   oxyCODONE-acetaminophen (PERCOCET) 5-325 MG per tablet Take 1 tablet by mouth every 6 hours as needed for Pain for up to 5 days.   Qty: 20 tablet, Refills: 0    Comments: Reduce doses taken as pain becomes manageable  Associated Diagnoses:  delivery, delivered, current hospitalization; Postoperative pain      ibuprofen (ADVIL;MOTRIN) 600 MG tablet Take 1 tablet by mouth every 6 hours as needed for Pain  Qty: 30 tablet, Refills: 1      lansinoh lanolin CREA ointment Apply 1 applicator topically every hour as needed for Dry Skin (nipple discomfort)  Refills: 0      docusate sodium (COLACE, DULCOLAX) 100 MG CAPS Take 100 mg by mouth 2 times daily as needed for Constipation         CONTINUE these medications which have NOT CHANGED    Details   Prenatal Vit-Fe Fumarate-FA (PRENATAL VITAMIN) 27-1 MG TABS tablet Take 1 tablet by mouth daily  Qty: 30 tablet, Refills: 11         STOP taking these medications       vitamin B-6 (B-6) 25 MG tablet Comments:   Reason for Stopping:              Follow-Up

## 2021-04-29 NOTE — DISCHARGE INSTR - ACTIVITY
After Your Delivery Discharge Instructions    What to do after you leave the hospital:  Recommended diet: regular diet    Recommended activity: No driving for 2 weeks. No sex or tampon use for 6 weeks. No douching. No heavy lifting (greater than baby and carrier) for 6 weeks. Initially, avoid frequent ambulation with stairs - start slowly then increase as tolerated. You may return to work in 10 -8 weeks. Showers are fine - avoid bath tubs, hot tubs, swimming. Follow-up in 1 week for incision check and dressing removal and in 6 weeks for final postpartum visit.     Call the Physician with any of these  signs and symptoms:    Warning signs regarding incision:  · \"Popping\" of stitches or staples  · Foul smelling discharge or pus  · More redness or streaks around surgical incision than before    Incision care:  · Keep incision uncovered  · No tub baths until OK'd by your Physician      After your delivery - signs and symptoms to watch for:  · Fever - Oral temperature greater than 100.4 degrees Fahrenheit  · Foul-smelling vaginal discharge  · Headache unrelieved by \"pain meds\"  · Difficulty urinating  · Breasts reddened, hard, hot to the touch  · Nipple discharge which is foul-smelling or contains pus  · Increased pain at the site of the surgical incision  · Difficulty breathing with or without chest pain  · New calf pain especially if only on one side  · Sudden, continuing increased vaginal bleeding (heavier than a period) with or without clots  · Unrelieved feelings of:  · Inability to cope  · Sadness  · Anxiety  · Lack of interest in baby  · Insomnia  · Crying     What to do at home:  · Resume activity gradually   · Don't lift anything heavier than baby and carrier until OK'd by your Physician   · No sex until OK'd by your Physician  · Eat regular nutritious meals  · Take pain medication as prescribed whenever you need them  · To avoid/relieve constipation take stool softeners if advised   · Increase fiber in your diet    Most importantly ENJOY your Baby!  - Dr. John Sanz =)))    Please call immediately with Questions and Concerns - [phone] (Office) or [phone] (Answering Service) after hours and weekends. By signing below, I understand that if any emergent problems occur, once I leave the hospital, I am to dial #[phone] or go immediately to the nearest Emergency Room. For less emergent matters,  Dr. Charlie Lilly can be reached by calling his Office or  answering service at the above numbers. I understand and acknowledge receipt of the instructions indicated above.

## 2021-04-29 NOTE — PROGRESS NOTES
CLINICAL PHARMACY NOTE: MEDS TO [address] [place] [address] Drive Select Patient?: No  Total # of Prescriptions Filled: 2   The following medications were delivered to the patient:  · Oxycodone 5/ 325 mg  · Ibuprofen 600 mg     Total # of Interventions Completed: 2  Time Spent (min): 30    Additional Documentation:

## 2021-09-24 ENCOUNTER — APPOINTMENT (OUTPATIENT)
Dept: CT IMAGING | Age: 31
End: 2021-09-24
Payer: COMMERCIAL

## 2021-09-24 ENCOUNTER — HOSPITAL ENCOUNTER (EMERGENCY)
Age: 31
Discharge: HOME | End: 2021-09-24
Attending: EMERGENCY MEDICINE
Payer: COMMERCIAL

## 2021-09-24 VITALS
WEIGHT: 155 LBS | SYSTOLIC BLOOD PRESSURE: 108 MMHG | TEMPERATURE: 96 F | DIASTOLIC BLOOD PRESSURE: 64 MMHG | OXYGEN SATURATION: 99 % | BODY MASS INDEX: 27.46 KG/M2 | HEIGHT: 63 IN | RESPIRATION RATE: 14 BRPM | HEART RATE: 85 BPM

## 2021-09-24 DIAGNOSIS — R51.9 NONINTRACTABLE EPISODIC HEADACHE, UNSPECIFIED HEADACHE TYPE: Primary | ICD-10-CM

## 2021-09-24 DIAGNOSIS — I67.1 SACCULAR ANEURYSM: ICD-10-CM

## 2021-09-24 LAB
ANION GAP SERPL CALCULATED.3IONS-SCNC: 9 MMOL/L (ref 7–16)
BASOPHILS ABSOLUTE: 0.02 E9/L (ref 0–0.2)
BASOPHILS RELATIVE PERCENT: 0.3 % (ref 0–2)
BUN BLDV-MCNC: 10 MG/DL (ref 6–20)
CALCIUM SERPL-MCNC: 9.5 MG/DL (ref 8.6–10.2)
CHLORIDE BLD-SCNC: 105 MMOL/L (ref 98–107)
CO2: 25 MMOL/L (ref 22–29)
CREAT SERPL-MCNC: 0.7 MG/DL (ref 0.5–1)
EOSINOPHILS ABSOLUTE: 0.08 E9/L (ref 0.05–0.5)
EOSINOPHILS RELATIVE PERCENT: 1.3 % (ref 0–6)
GFR AFRICAN AMERICAN: >60
GFR NON-AFRICAN AMERICAN: >60 ML/MIN/1.73
GLUCOSE BLD-MCNC: 90 MG/DL (ref 74–99)
HCG, URINE, POC: NEGATIVE
HCT VFR BLD CALC: 43.7 % (ref 34–48)
HEMOGLOBIN: 14.5 G/DL (ref 11.5–15.5)
IMMATURE GRANULOCYTES #: 0.01 E9/L
IMMATURE GRANULOCYTES %: 0.2 % (ref 0–5)
LYMPHOCYTES ABSOLUTE: 2.75 E9/L (ref 1.5–4)
LYMPHOCYTES RELATIVE PERCENT: 44 % (ref 20–42)
Lab: NORMAL
MCH RBC QN AUTO: 30.9 PG (ref 26–35)
MCHC RBC AUTO-ENTMCNC: 33.2 % (ref 32–34.5)
MCV RBC AUTO: 93 FL (ref 80–99.9)
MONOCYTES ABSOLUTE: 0.27 E9/L (ref 0.1–0.95)
MONOCYTES RELATIVE PERCENT: 4.3 % (ref 2–12)
NEGATIVE QC PASS/FAIL: NORMAL
NEUTROPHILS ABSOLUTE: 3.12 E9/L (ref 1.8–7.3)
NEUTROPHILS RELATIVE PERCENT: 49.9 % (ref 43–80)
PDW BLD-RTO: 11.1 FL (ref 11.5–15)
PLATELET # BLD: 215 E9/L (ref 130–450)
PMV BLD AUTO: 9 FL (ref 7–12)
POSITIVE QC PASS/FAIL: NORMAL
POTASSIUM REFLEX MAGNESIUM: 4.5 MMOL/L (ref 3.5–5)
RBC # BLD: 4.7 E12/L (ref 3.5–5.5)
SODIUM BLD-SCNC: 139 MMOL/L (ref 132–146)
WBC # BLD: 6.3 E9/L (ref 4.5–11.5)

## 2021-09-24 PROCEDURE — 6360000004 HC RX CONTRAST MEDICATION: Performed by: RADIOLOGY

## 2021-09-24 PROCEDURE — 70498 CT ANGIOGRAPHY NECK: CPT

## 2021-09-24 PROCEDURE — 96375 TX/PRO/DX INJ NEW DRUG ADDON: CPT

## 2021-09-24 PROCEDURE — 93005 ELECTROCARDIOGRAM TRACING: CPT | Performed by: NURSE PRACTITIONER

## 2021-09-24 PROCEDURE — 80048 BASIC METABOLIC PNL TOTAL CA: CPT

## 2021-09-24 PROCEDURE — 85025 COMPLETE CBC W/AUTO DIFF WBC: CPT

## 2021-09-24 PROCEDURE — 99284 EMERGENCY DEPT VISIT MOD MDM: CPT

## 2021-09-24 PROCEDURE — 6360000002 HC RX W HCPCS: Performed by: EMERGENCY MEDICINE

## 2021-09-24 PROCEDURE — 96374 THER/PROPH/DIAG INJ IV PUSH: CPT

## 2021-09-24 PROCEDURE — 2580000003 HC RX 258: Performed by: EMERGENCY MEDICINE

## 2021-09-24 PROCEDURE — 70450 CT HEAD/BRAIN W/O DYE: CPT

## 2021-09-24 PROCEDURE — 70496 CT ANGIOGRAPHY HEAD: CPT

## 2021-09-24 RX ORDER — METHYLPREDNISOLONE SODIUM SUCCINATE 125 MG/2ML
125 INJECTION, POWDER, LYOPHILIZED, FOR SOLUTION INTRAMUSCULAR; INTRAVENOUS ONCE
Status: COMPLETED | OUTPATIENT
Start: 2021-09-24 | End: 2021-09-24

## 2021-09-24 RX ORDER — DIPHENHYDRAMINE HYDROCHLORIDE 50 MG/ML
25 INJECTION INTRAMUSCULAR; INTRAVENOUS ONCE
Status: COMPLETED | OUTPATIENT
Start: 2021-09-24 | End: 2021-09-24

## 2021-09-24 RX ORDER — 0.9 % SODIUM CHLORIDE 0.9 %
500 INTRAVENOUS SOLUTION INTRAVENOUS ONCE
Status: COMPLETED | OUTPATIENT
Start: 2021-09-24 | End: 2021-09-24

## 2021-09-24 RX ORDER — PROCHLORPERAZINE EDISYLATE 5 MG/ML
10 INJECTION INTRAMUSCULAR; INTRAVENOUS ONCE
Status: COMPLETED | OUTPATIENT
Start: 2021-09-24 | End: 2021-09-24

## 2021-09-24 RX ADMIN — SODIUM CHLORIDE 500 ML: 9 INJECTION, SOLUTION INTRAVENOUS at 11:05

## 2021-09-24 RX ADMIN — IOPAMIDOL 75 ML: 755 INJECTION, SOLUTION INTRAVENOUS at 11:43

## 2021-09-24 RX ADMIN — DIPHENHYDRAMINE HYDROCHLORIDE 25 MG: 50 INJECTION INTRAMUSCULAR; INTRAVENOUS at 10:57

## 2021-09-24 RX ADMIN — PROCHLORPERAZINE EDISYLATE 10 MG: 5 INJECTION INTRAMUSCULAR; INTRAVENOUS at 10:57

## 2021-09-24 RX ADMIN — METHYLPREDNISOLONE SODIUM SUCCINATE 125 MG: 125 INJECTION, POWDER, FOR SOLUTION INTRAMUSCULAR; INTRAVENOUS at 10:56

## 2021-09-24 ASSESSMENT — PAIN DESCRIPTION - DESCRIPTORS: DESCRIPTORS: ACHING

## 2021-09-24 ASSESSMENT — PAIN DESCRIPTION - PROGRESSION: CLINICAL_PROGRESSION: NOT CHANGED

## 2021-09-24 ASSESSMENT — PAIN DESCRIPTION - LOCATION: LOCATION: HEAD

## 2021-09-24 ASSESSMENT — PAIN DESCRIPTION - ONSET: ONSET: ON-GOING

## 2021-09-24 ASSESSMENT — PAIN SCALES - GENERAL: PAINLEVEL_OUTOF10: 6

## 2021-09-24 ASSESSMENT — PAIN SCALES - WONG BAKER: WONGBAKER_NUMERICALRESPONSE: 2

## 2021-09-24 ASSESSMENT — PAIN - FUNCTIONAL ASSESSMENT: PAIN_FUNCTIONAL_ASSESSMENT: ACTIVITIES ARE NOT PREVENTED

## 2021-09-24 ASSESSMENT — PAIN DESCRIPTION - FREQUENCY: FREQUENCY: INTERMITTENT

## 2021-09-24 ASSESSMENT — PAIN DESCRIPTION - PAIN TYPE: TYPE: ACUTE PAIN

## 2021-09-24 NOTE — ED PROVIDER NOTES
[facility] [address]  Department of Emergency Medicine   ED  Encounter Note  Admit Date/RoomTime: 2021  9:11 AM  ED Room: [facility]09    NAME: Alexey Pang  : 1990  MRN: [id number]     Chief Complaint:  Headache, Dizziness, and Anorexia    History of Present Illness      Alexey Pang is a 35 y.o. old female who presents to the emergency department for evaluation of headache and dizziness. She states that about a week ago, she felt a pop on the right side of her head and then had a warm sensation go across her forehead and down to her neck. She has had a headache intermittently every day since then. She also feels brain fog and a bit off balance. She has had a poor appetite. She feels a bit dizzy with ambulation and changing positions. She denied any blurry vision. No weakness in the arms or legs. Her daughter does have a AV malformation. She denies any other history of aneurysms or neurologic issues in her family to her knowledge. She has had no fever, chills or infectious symptoms. No falls or trauma. ROS   Pertinent positives and negatives are stated within HPI, all other systems reviewed and are negative. Past Medical History:  has a past medical history of Hemorrhoids, postpartum condition, PCOS (polycystic ovarian syndrome), Postoperative anemia due to acute blood loss, and Rh negative, maternal.    Surgical History:  has a past surgical history that includes Appendectomy; Tonsillectomy; Dilation and curettage of uterus (2017); polypectomy (2017); Endometrial biopsy (2017); polypectomy (2017);  section (N/A, 5/3/2019); and  section (N/A, 2021). Social History:  reports that she has never smoked. She has never used smokeless tobacco. She reports that she does not drink alcohol and does not use drugs. Family History: family history includes High Blood Pressure in her mother.      Allergies: Patient has no known allergies. Physical Exam   Oxygen Saturation Interpretation: Normal.        ED Triage Vitals   BP Temp Temp Source Pulse Resp SpO2 Height Weight   09/24/21 0903 09/24/21 0903 09/24/21 0903 09/24/21 0849 09/24/21 0849 09/24/21 0849 09/24/21 0849 09/24/21 0849   108/64 96 °F (35.6 °C) Temporal 85 14 99 % 5' 3\" (1.6 m) 155 lb (70.3 kg)         Constitutional:  Alert, development consistent with age. HEENT:  NC/NT. PERRLA. Airway patent. Neck:  Normal ROM. Supple. No meningeal signs  Respiratory:  Clear to auscultation and breath sounds equal.  CV:  Regular rate and rhythm, normal heart sounds, without pathological murmurs, ectopy, gallops, or rubs. GI:  Abdomen Soft, nontender, good bowel sounds. No firm or pulsatile mass. Back:  No costovertebral tenderness. Extremities: No tenderness or edema noted. Integument:  Normal turgor. Warm, dry, without visible rash, unless noted elsewhere. Lymphatics: No lymphangitis or adenopathy noted. Neurological:  Oriented x 3, GCS 15. CNII-XII grossly intact. Motor functions intact.      Lab / Imaging Results   (All laboratory and radiology results have been personally reviewed by myself)  Labs:  Results for orders placed or performed during the hospital encounter of 09/24/21   CBC Auto Differential   Result Value Ref Range    WBC 6.3 4.5 - 11.5 E9/L    RBC 4.70 3.50 - 5.50 E12/L    Hemoglobin 14.5 11.5 - 15.5 g/dL    Hematocrit 43.7 34.0 - 48.0 %    MCV 93.0 80.0 - 99.9 fL    MCH 30.9 26.0 - 35.0 pg    MCHC 33.2 32.0 - 34.5 %    RDW 11.1 (L) 11.5 - 15.0 fL    Platelets 909 181 - 661 E9/L    MPV 9.0 7.0 - 12.0 fL    Neutrophils % 49.9 43.0 - 80.0 %    Immature Granulocytes % 0.2 0.0 - 5.0 %    Lymphocytes % 44.0 (H) 20.0 - 42.0 %    Monocytes % 4.3 2.0 - 12.0 %    Eosinophils % 1.3 0.0 - 6.0 %    Basophils % 0.3 0.0 - 2.0 %    Neutrophils Absolute 3.12 1.80 - 7.30 E9/L    Immature Granulocytes # 0.01 E9/L    Lymphocytes Absolute 2.75 1.50 - 4.00 E9/L    Monocytes Absolute 0.27 0.10 - 0.95 E9/L    Eosinophils Absolute 0.08 0.05 - 0.50 E9/L    Basophils Absolute 0.02 0.00 - 0.20 O1/M   Basic Metabolic Panel w/ Reflex to MG   Result Value Ref Range    Sodium 139 132 - 146 mmol/L    Potassium reflex Magnesium 4.5 3.5 - 5.0 mmol/L    Chloride 105 98 - 107 mmol/L    CO2 25 22 - 29 mmol/L    Anion Gap 9 7 - 16 mmol/L    Glucose 90 74 - 99 mg/dL    BUN 10 6 - 20 mg/dL    CREATININE 0.7 0.5 - 1.0 mg/dL    GFR Non-African American >60 >=60 mL/min/1.73    GFR African American >60     Calcium 9.5 8.6 - 10.2 mg/dL   POC Pregnancy Urine   Result Value Ref Range    HCG, Urine, POC Negative Negative    Lot Number [id number]     Positive QC Pass/Fail Pass     Negative QC Pass/Fail Pass    EKG 12 Lead   Result Value Ref Range    Ventricular Rate 74 BPM    Atrial Rate 74 BPM    P-R Interval 146 ms    QRS Duration 76 ms    Q-T Interval 374 ms    QTc Calculation (Bazett) 415 ms    P Axis 53 degrees    R Axis 51 degrees    T Axis 24 degrees     Imaging: All Radiology results interpreted by Radiologist unless otherwise noted. CTA HEAD W CONTRAST   Final Result   1.4 mm prominent infundibulum versus saccular aneurysm at the origin of the   right posterior communicating artery. Otherwise, no acute abnormality or flow-limiting stenosis in the major   arteries of the head and neck. CTA NECK W CONTRAST   Final Result   1.4 mm prominent infundibulum versus saccular aneurysm at the origin of the   right posterior communicating artery. Otherwise, no acute abnormality or flow-limiting stenosis in the major   arteries of the head and neck. CT Head WO Contrast   Final Result   No acute intracranial abnormality. No intracranial hemorrhage.            ED Course / Medical Decision Making     Medications   0.9 % sodium chloride bolus (0 mLs IntraVENous Stopped 9/24/21 1210)   prochlorperazine (COMPAZINE) injection 10 mg (10 mg IntraVENous Given 9/24/21 1057)   diphenhydrAMINE

## 2021-09-25 LAB
EKG ATRIAL RATE: 74 BPM
EKG P AXIS: 53 DEGREES
EKG P-R INTERVAL: 146 MS
EKG Q-T INTERVAL: 374 MS
EKG QRS DURATION: 76 MS
EKG QTC CALCULATION (BAZETT): 415 MS
EKG R AXIS: 51 DEGREES
EKG T AXIS: 24 DEGREES
EKG VENTRICULAR RATE: 74 BPM

## 2021-09-30 ENCOUNTER — OFFICE VISIT (OUTPATIENT)
Dept: NEUROSURGERY | Age: 31
End: 2021-09-30
Payer: COMMERCIAL

## 2021-09-30 VITALS
TEMPERATURE: 97.9 F | DIASTOLIC BLOOD PRESSURE: 76 MMHG | BODY MASS INDEX: 27.46 KG/M2 | RESPIRATION RATE: 16 BRPM | WEIGHT: 155 LBS | SYSTOLIC BLOOD PRESSURE: 111 MMHG | OXYGEN SATURATION: 94 % | HEIGHT: 63 IN | HEART RATE: 71 BPM

## 2021-09-30 DIAGNOSIS — I67.1 ANEURYSM OF POSTERIOR COMMUNICATING ARTERY: Primary | ICD-10-CM

## 2021-09-30 PROCEDURE — 99203 OFFICE O/P NEW LOW 30 MIN: CPT

## 2021-09-30 ASSESSMENT — ENCOUNTER SYMPTOMS
NAUSEA: 0
ABDOMINAL DISTENTION: 0
BACK PAIN: 0
DIARRHEA: 1
VOMITING: 0
TROUBLE SWALLOWING: 0
SHORTNESS OF BREATH: 0

## 2021-10-21 ENCOUNTER — HOSPITAL ENCOUNTER (EMERGENCY)
Age: 31
Discharge: LEFT BEFORE DISCHARGE | End: 2021-10-21
Payer: COMMERCIAL

## 2021-10-21 VITALS
WEIGHT: 155 LBS | BODY MASS INDEX: 27.46 KG/M2 | SYSTOLIC BLOOD PRESSURE: 110 MMHG | TEMPERATURE: 97.5 F | OXYGEN SATURATION: 99 % | HEIGHT: 63 IN | DIASTOLIC BLOOD PRESSURE: 73 MMHG | RESPIRATION RATE: 14 BRPM | HEART RATE: 103 BPM

## 2021-10-21 RX ORDER — ONDANSETRON 2 MG/ML
4 INJECTION INTRAMUSCULAR; INTRAVENOUS ONCE
Status: DISCONTINUED | OUTPATIENT
Start: 2021-10-21 | End: 2021-10-21 | Stop reason: HOSPADM

## 2021-10-21 RX ORDER — 0.9 % SODIUM CHLORIDE 0.9 %
1000 INTRAVENOUS SOLUTION INTRAVENOUS ONCE
Status: DISCONTINUED | OUTPATIENT
Start: 2021-10-21 | End: 2021-10-21 | Stop reason: HOSPADM

## 2021-10-21 NOTE — ED NOTES
FIRST PROVIDER CONTACT ASSESSMENT NOTE                                                                                                Department of Emergency Medicine                                                      First Provider Note  10/21/21  12:02 PM EDT  NAME: Emely Navarro  : 1990  MRN: [id number]    Chief Complaint: Dizziness (dizziness, emesis, hematuria) and Fever      History of Present Illness:   Emely Navarro is a 28 y.o. female who presents to the ED for 1 month patient has had dizziness, nausea, vomiting and now blood in her urine. Patient states she just does not feel well and feels off. Patient states she was seen last month and felt the same way. Patient states is been intermittent since then    Focused Physical Exam:  VS:    ED Triage Vitals [10/21/21 1200]   BP Temp Temp Source Pulse Resp SpO2 Height Weight   110/73 97.5 °F (36.4 °C) Temporal 103 14 99 % 5' 3\" (1.6 m) 155 lb (70.3 kg)        General: Alert and in no apparent distress. Medical History:  has a past medical history of Hemorrhoids, postpartum condition, PCOS (polycystic ovarian syndrome), Postoperative anemia due to acute blood loss, and Rh negative, maternal.    Surgical History:  has a past surgical history that includes Appendectomy; Tonsillectomy; Dilation and curettage of uterus (2017); polypectomy (2017); Endometrial biopsy (2017); polypectomy (2017);  section (N/A, 5/3/2019); and  section (N/A, 2021). Social History:  reports that she has never smoked. She has never used smokeless tobacco. She reports that she does not drink alcohol and does not use drugs. Family History: family history includes High Blood Pressure in her mother. Allergies: Patient has no known allergies.      Initial Plan of Care:  Initiate Treatment-Testing, Proceed toTreatment Area When Bed Available for ED Attending/MLP to Continue Care    -------------------------------------------------END OF FIRST PROVIDER CONTACT ASSESSMENT NOTE--------------------------------------------------------  Electronically signed by Renee Bearden PA-C   DD: 10/21/21       Renee Bearden PA-C  10/21/21 3977

## 2021-11-02 ENCOUNTER — OFFICE VISIT (OUTPATIENT)
Dept: NEUROLOGY | Age: 31
End: 2021-11-02
Payer: COMMERCIAL

## 2021-11-02 VITALS
RESPIRATION RATE: 18 BRPM | HEART RATE: 80 BPM | TEMPERATURE: 98 F | BODY MASS INDEX: 28.88 KG/M2 | WEIGHT: 163 LBS | DIASTOLIC BLOOD PRESSURE: 84 MMHG | OXYGEN SATURATION: 98 % | HEIGHT: 63 IN | SYSTOLIC BLOOD PRESSURE: 116 MMHG

## 2021-11-02 DIAGNOSIS — G43.119 INTRACTABLE MIGRAINE WITH AURA WITHOUT STATUS MIGRAINOSUS: Primary | ICD-10-CM

## 2021-11-02 DIAGNOSIS — I67.1 CEREBRAL ARTERIAL ANEURYSM: ICD-10-CM

## 2021-11-02 PROCEDURE — G8427 DOCREV CUR MEDS BY ELIG CLIN: HCPCS | Performed by: PSYCHIATRY & NEUROLOGY

## 2021-11-02 PROCEDURE — 1036F TOBACCO NON-USER: CPT | Performed by: PSYCHIATRY & NEUROLOGY

## 2021-11-02 PROCEDURE — 99204 OFFICE O/P NEW MOD 45 MIN: CPT | Performed by: PSYCHIATRY & NEUROLOGY

## 2021-11-02 PROCEDURE — G8417 CALC BMI ABV UP PARAM F/U: HCPCS | Performed by: PSYCHIATRY & NEUROLOGY

## 2021-11-02 PROCEDURE — G8484 FLU IMMUNIZE NO ADMIN: HCPCS | Performed by: PSYCHIATRY & NEUROLOGY

## 2021-11-02 RX ORDER — INDOMETHACIN 25 MG/1
25 CAPSULE ORAL 3 TIMES DAILY PRN
Qty: 30 CAPSULE | Refills: 3 | Status: SHIPPED | OUTPATIENT
Start: 2021-11-02

## 2021-11-02 NOTE — PROGRESS NOTES
Neuro endovascular Surgery Consultation          Mg Bueno is a 28 y.o. Woman who is here for evaluation and management of 1.4 mm PCOM infundibulum. She is referred by Chelsie CALDERÓN to me to provide services as a Neuro endovascular specialist      Past Medical History:     Past Medical History:   Diagnosis Date    Hemorrhoids, postpartum condition 2015    PCOS (polycystic ovarian syndrome)     Postoperative anemia due to acute blood loss 2019    Rh negative, maternal 2015       Past Surgical History:     Past Surgical History:   Procedure Laterality Date    APPENDECTOMY       SECTION N/A 5/3/2019     SECTION performed by Princess [facility] at [facility]     SECTION N/A 2021     SECTION performed by Princess [facility]o MD at [facility]    DILATION AND CURETTAGE OF UTERUS  2017    ENDOMETRIAL BIOPSY  2017    POLYPECTOMY  2017    POLYPECTOMY  2017    D&C    TONSILLECTOMY         Allergies:     Patient has no known allergies. Medications:     Prior to Admission medications    Medication Sig Start Date End Date Taking?  Authorizing Provider   indomethacin (INDOCIN) 25 MG capsule Take 1 capsule by mouth 3 times daily as needed for Pain (headache- migraine) Take with food, max 2 doses per 24 hour period 21  Yes Marlene Barraza MD   norethindrone-ethinyl estradiol-Fe (LO LOESTRIN FE) 1 MG-10 MCG / 10 MCG tablet Take 1 tablet by mouth daily 21  Yes Princess Arevalo MD       Social History:     Social History     Tobacco Use    Smoking status: Never Smoker    Smokeless tobacco: Never Used   Vaping Use    Vaping Use: Never used   Substance Use Topics    Alcohol use: No    Drug use: No       Review of Systems:     No chest pain or palpitations  No SOB  No vertigo, lightheadedness or loss of consciousness  No falls, tripping or stumbling  No incontinence of bowels or bladder  No itching or bruising appreciated  No numbness, tingling or focal arm/leg weakness    ROS otherwise negative     Family History:     Family History   Problem Relation Age of Onset    High Blood Pressure Mother         History of Present Illness: This is a 72-year-old female who presents as a urgent referral from her PCP due to the presence of a prominent infundibulum that was reported on her CTA. This measures less than 2 mm in size and is only 1.4 mm which is less than the criteria for neuro endovascular referral.  Patient also does not have any symptoms on the right side. All her headaches are on the left side. She has a history of migraines even before her first child however the characteristics of migraines had changed after first child and even disappeared after her second child. She had her most recent fourth child about 5 months ago and over the past 3 to 4 months she has been having a different type of migraine. This correlates with the fact that migraine characteristics change with most pregnancies    She also has a correlating severe stressor and anxiety her severe stressor is her job she works as a public health teacher and this has also contributed to her headaches. On description she describes a left-sided constant headache with intermittent zapping and cervicalgia associated with it this sometimes progresses into a complex migraine with some neurological numbness and tingling neck stiffness and fogginess with an aura. She has had auras of popping noises and bright lights.             Objective:   /84 (Site: Left Upper Arm, Position: Sitting, Cuff Size: Medium Adult)   Pulse 80   Temp 98 °F (36.7 °C) (Infrared)   Resp 18   Ht 5' 3\" (1.6 m)   Wt 163 lb (73.9 kg)   SpO2 98%   Breastfeeding No   BMI 28.87 kg/m²      General appearance: alert, appears stated age and cooperative  Head: Normocephalic, without obvious abnormality, atraumatic  Neck: no adenopathy, no carotid bruit and limited ROM  Lungs: clear to auscultation bilaterally  Heart: regular rate and rhythm  Extremities: no cyanosis or edema  Pulses: 2+ and symmetric  Skin: no rashes or lesions     Constitutional: Well developed, well nourished and in no acute distress. Respiratory: Clear to auscultation bilaterally with no use of accessory muscles during respiration. Cardiovascular:  No murmurs auscultated. Carotid arteries without bruits. Pedal pulses and radial pulses 2+ bilaterally. No edema in all four extremities. Mental Status:    Alert and oriented to person, place, and time. Recent and remote memory: Intact  Attention and concentration: Intact  Speech and language : Intact  Fund of knowledge: Intact  Judgement and Insight: Intact    Cranial Nerves:     II: Visual Fields: Full to confrontation bilaterally   III, IV, VI: Pupils: equally round and reactive to light, 3  to 2  mm bilaterally. EOMs: full with no nystagmus. V: Sensation intact to light touch and pin prick sensation bilaterally  VII: symmetric and strong in the upper and lower face bilaterally  VIII: Hearing intact to finger rub bilaterally   IX,X: Palate elevates symmetrically. XI: head turn (sternocleidomastoid) and shoulder shrug (trapezius) 5/5 bilaterally   XII: Tongue is midline upon protrusion    Motor:   Normal tone and bulk. Normal fine finger movements. No pronator drift. No resting tremors, postural tremors or myoclonus.     Upper Extremity Right Left  Lower Extremity Right Left  Deltoid              5 5      Hip Flexion             5 5  Biceps              5 5   Hip Extension             5 5  Triceps                        5 5   Hip Adduction             5 5  Wrist Extension 5 5   Knee Extension 5 5  Wrist Flexion             5 5                Knee Flexion 5 5  Index Finger Flexion 5 5  Ankle Dorsiflexion 5 5  Finger Extension 5 5  Ankle Plantarflexion 5 5  APB                        5 5   Extensor Hallucis Longus 5 5       Deep Tendon Reflexes: Right Left  triceps              2 2  biceps              2 2  brachioradialis  2 2  knee jerk  2 2  ankle jerk  2  2  ankle clonus none none  toes      down down    Sensory:  Intact light touch and pinprick in upper and lower extremities bilaterally. Intact proprioception and vibration sense in upper and lower extremities bilaterally. Coordination:   Alternating hand and foot movements intact in the UE and LE bilaterally  Finger-to-nose and Heel-to-shin with no ataxia or intention tremor bilaterally    Gait/Station:   Patient rises from a seated position without assistance. Romberg's test negative. Gait pattern is without hesitation, a wide-base, and of normal stride length. Heel, toe and tandem walking are intact.     Laboratory/Radiology:     CBC:   Lab Results   Component Value Date    WBC 6.3 09/24/2021    RBC 4.70 09/24/2021    HGB 14.5 09/24/2021    HCT 43.7 09/24/2021    MCV 93.0 09/24/2021    MCH 30.9 09/24/2021    MCHC 33.2 09/24/2021    RDW 11.1 09/24/2021     09/24/2021    MPV 9.0 09/24/2021     CBC with Differential:    Lab Results   Component Value Date    WBC 6.3 09/24/2021    RBC 4.70 09/24/2021    HGB 14.5 09/24/2021    HCT 43.7 09/24/2021     09/24/2021    MCV 93.0 09/24/2021    MCH 30.9 09/24/2021    MCHC 33.2 09/24/2021    RDW 11.1 09/24/2021    NRBC 0.0 05/04/2019    SEGSPCT 54 11/04/2012    LYMPHOPCT 44.0 09/24/2021    MONOPCT 4.3 09/24/2021    BASOPCT 0.3 09/24/2021    MONOSABS 0.27 09/24/2021    LYMPHSABS 2.75 09/24/2021    EOSABS 0.08 09/24/2021    BASOSABS 0.02 09/24/2021     WBC:    Lab Results   Component Value Date    WBC 6.3 09/24/2021     Platelets:    Lab Results   Component Value Date     09/24/2021     Hemoglobin/Hematocrit:    Lab Results   Component Value Date    HGB 14.5 09/24/2021    HCT 43.7 09/24/2021     CMP:    Lab Results   Component Value Date     09/24/2021    K 4.5 09/24/2021     09/24/2021    CO2 25 09/24/2021    BUN 10 09/24/2021    CREATININE 0.7 09/24/2021    GFRAA >60 09/24/2021    LABGLOM >60 09/24/2021    GLUCOSE 90 09/24/2021    GLUCOSE 93 04/13/2011    PROT 6.7 03/13/2019    LABALBU 3.6 03/13/2019    LABALBU 4.4 04/13/2011    CALCIUM 9.5 09/24/2021    BILITOT 0.2 03/13/2019    ALKPHOS 84 03/13/2019    AST 15 03/13/2019    ALT 8 03/13/2019       I independently reviewed the labs and imaging studies today. Assessment:     Hemicrania Continua ( left side)  Migraine headaches with aura  Asymptomatic very tiny 1.4mm RIGHT SIDED PCOM infundibulum      Plan:     I have discussed with the patient in detail I have informed her that she does not have a traditional aneurysm but this is an infundibulum. An infundibulum is a dilatation of the origin of the vessel. The natural course of the disease is that this does not usually rupture. I have drawn pictures and explained in detail the same to her. Additionally patient symptoms are on the left side of her head. The infundibulum is on her right side. It is unlikely that her headaches are related to this infundibulum. Patient was concerned about the pop I have told her that these are probably related to her type of migraine that she has. The size of the infundibulum is also 1.4 mm. This is too small to cause any issues    Patient seems to have from history LEFT SIDED hemicrania continua which progresses to migraine headaches with aura she also has associated cervicalgia with the migraines. At this time for immediate treatment I have recommended indomethacin patient is not eager about taking medications hence can take it when her hemicrania is severe. Because she has a complex migraine history and her characteristics of migraine have changed with her recent pregnancy it is best that she sees a dedicated headache specialist for the same and discuss treatment options for the same.     At this time patient agrees with me that unnecessary procedures would not be in her best interest.  She can be followed up every year with a serial CT angiogram.  If anything changes she is advised to call me and we can see if she needs a diagnostic angiogram however at this time she does not. Follow up in 1 year  -She will discuss with her primary care physician about referral to a headache specialist of his choice    Nellie Ravi MD  3:04 PM  11/2/2021    I spent 45 minutes with the patient, with 50% or more counseling them on their diagnosis, diagnostic workup and treatment options.

## 2022-05-03 ENCOUNTER — HOSPITAL ENCOUNTER (OUTPATIENT)
Age: 32
Discharge: HOME | End: 2022-05-05

## 2022-05-03 PROCEDURE — 88305 TISSUE EXAM BY PATHOLOGIST: CPT

## 2023-02-15 PROBLEM — N94.6 DYSMENORRHEA: Status: ACTIVE | Noted: 2023-02-15

## 2024-07-22 ENCOUNTER — HOSPITAL ENCOUNTER (OUTPATIENT)
Age: 34
Discharge: HOME | End: 2024-07-24

## 2024-07-30 LAB — SURGICAL PATHOLOGY REPORT: NORMAL

## 2024-08-05 ENCOUNTER — HOSPITAL ENCOUNTER (OUTPATIENT)
Dept: NUCLEAR MEDICINE | Age: 34
Discharge: HOME | End: 2024-08-07
Attending: SURGERY
Payer: COMMERCIAL

## 2024-08-05 VITALS — BODY MASS INDEX: 28.34 KG/M2 | WEIGHT: 160 LBS

## 2024-08-05 DIAGNOSIS — R11.2 NAUSEA AND VOMITING, UNSPECIFIED VOMITING TYPE: ICD-10-CM

## 2024-08-05 DIAGNOSIS — R10.816 EPIGASTRIC ABDOMINAL TENDERNESS, REBOUND TENDERNESS PRESENCE NOT SPECIFIED: ICD-10-CM

## 2024-08-05 PROCEDURE — A9537 TC99M MEBROFENIN: HCPCS | Performed by: RADIOLOGY

## 2024-08-05 PROCEDURE — 78227 HEPATOBIL SYST IMAGE W/DRUG: CPT

## 2024-08-05 PROCEDURE — 6360000002 HC RX W HCPCS: Performed by: SURGERY

## 2024-08-05 PROCEDURE — 3430000000 HC RX DIAGNOSTIC RADIOPHARMACEUTICAL: Performed by: RADIOLOGY

## 2024-08-05 PROCEDURE — 2580000003 HC RX 258: Performed by: SURGERY

## 2024-08-05 RX ADMIN — Medication 7.7 MILLICURIE: at 08:30

## 2024-08-05 RX ADMIN — SINCALIDE 1.45 MCG: 5 INJECTION, POWDER, LYOPHILIZED, FOR SOLUTION INTRAVENOUS at 09:41

## (undated) DEVICE — BLADE SURG NO20 S STL STR DISP GLASSVAN

## (undated) DEVICE — 3000CC GUARDIAN II: Brand: GUARDIAN

## (undated) DEVICE — COUNTER NDL 30 COUNT DBL MAG

## (undated) DEVICE — GOWN,SIRUS,FABRNF,L,20/CS: Brand: MEDLINE

## (undated) DEVICE — GOWN,SIRUS,POLYRNF,BRTHSLV,XLN/XL,20/CS: Brand: MEDLINE

## (undated) DEVICE — MEDI-VAC YANKAUER SUCTION HANDLE W/BULBOUS TIP: Brand: CARDINAL HEALTH

## (undated) DEVICE — HYPODERMIC SAFETY NEEDLE: Brand: MAGELLAN

## (undated) DEVICE — TUBING, SUCTION, 3/16" X 12', STRAIGHT: Brand: MEDLINE

## (undated) DEVICE — TUBE BLD COLLECT ST 1 SIL COAT 7ML 10ML

## (undated) DEVICE — SUTURE CHROMIC GUT SZ 2-0 L36IN ABSRB BRN L36MM CT-1 1/2 923H

## (undated) DEVICE — ELECTRODE PT RET AD L9FT HI MOIST COND ADH HYDRGEL CORDED

## (undated) DEVICE — Device: Brand: PORTEX

## (undated) DEVICE — SUTURE STRATAFIX SYMMETRIC SZ 1 L18IN ABSRB VLT CT1 L36CM SXPP1A404

## (undated) DEVICE — SHEET,DRAPE,53X77,STERILE: Brand: MEDLINE

## (undated) DEVICE — TOWEL,OR,DSP,ST,BLUE,STD,6/PK,12PK/CS: Brand: MEDLINE

## (undated) DEVICE — PEN: MARKING STD 100/CS: Brand: MEDICAL ACTION INDUSTRIES

## (undated) DEVICE — COVER,LIGHT HANDLE,FLX,2/PK: Brand: MEDLINE INDUSTRIES, INC.

## (undated) DEVICE — CESAREAN BIRTH PACK II: Brand: MEDLINE INDUSTRIES, INC.

## (undated) DEVICE — CATHETERIZATION KIT FOL16 FR 2000 CC DRAINAGE BG LUBRICATH

## (undated) DEVICE — GLOVE SURG SZ 75 L12IN FNGR THK83MIL CRM POLYISOPRENE

## (undated) DEVICE — SUTURE VCRL + SZ 3-0 L36IN ABSRB UD L36MM CT-1 1/2 CIR VCP944H

## (undated) DEVICE — PENCIL ES L3M BTTN SWCH HOLSTER W/ BLDE ELECTRD EDGE

## (undated) DEVICE — DRESSING ALG W8XL20IN THN ABSRB SELF ADH BORD MEPILEX

## (undated) DEVICE — SUTURE VCRL + SZ 3-0 L27IN ABSRB UD L26MM SH 1/2 CIR VCP416H

## (undated) DEVICE — GLOVE SURG SZ 65 L12IN FNGR THK83MIL CRM POLYISOPRENE

## (undated) DEVICE — SUTURE CHROMIC GUT SZ 1 L36IN ABSRB BRN L40MM CT 1/2 CIR 915H

## (undated) DEVICE — DRESSING FOAM POST OPERATIVE 4X10 IN MEPILEX BORDER AG

## (undated) DEVICE — SUTURE PLN GUT SZ 3-0 L27IN ABSRB YELLOWISH TAN L36MM CT-1 842H

## (undated) DEVICE — SUTURE MNCRYL STRATAFIX PS 4-0 30CM

## (undated) DEVICE — CONTAINER SPEC 64OZ POLYPR PATH SNAP LOK CAP W/ LID

## (undated) DEVICE — APPLICATOR PREP 26ML 0.7% IOD POVACRYLEX 74% ISO ALC ST

## (undated) DEVICE — SPONGE LAP W18XL18IN WHT COT 4 PLY FLD STRUNG RADPQ DISP ST

## (undated) DEVICE — CONTAINER,SPEC,PNEUM TUBE,3OZ,STRL PATH: Brand: MEDLINE